# Patient Record
Sex: MALE | Race: WHITE | NOT HISPANIC OR LATINO | Employment: OTHER | URBAN - METROPOLITAN AREA
[De-identification: names, ages, dates, MRNs, and addresses within clinical notes are randomized per-mention and may not be internally consistent; named-entity substitution may affect disease eponyms.]

---

## 2018-01-23 ENCOUNTER — OFFICE VISIT CONVERTED (OUTPATIENT)
Dept: OTOLARYNGOLOGY | Facility: CLINIC | Age: 78
End: 2018-01-23
Attending: OTOLARYNGOLOGY

## 2019-01-02 ENCOUNTER — OFFICE VISIT CONVERTED (OUTPATIENT)
Dept: NEUROLOGY | Facility: CLINIC | Age: 79
End: 2019-01-02
Attending: PSYCHIATRY & NEUROLOGY

## 2019-04-24 ENCOUNTER — HOSPITAL ENCOUNTER (OUTPATIENT)
Dept: LAB | Facility: HOSPITAL | Age: 79
Discharge: HOME | End: 2019-04-24
Attending: INTERNAL MEDICINE

## 2019-04-24 LAB
25(OH)D3 SERPL-MCNC: 63.7 NG/ML (ref 30–100)
ALBUMIN SERPL-MCNC: 4 G/DL (ref 3.5–5)
ALBUMIN/GLOB SERPL: 1.4 {RATIO} (ref 1.4–2.6)
ALP SERPL-CCNC: 86 U/L (ref 56–155)
ALT SERPL-CCNC: 17 U/L (ref 10–40)
ANION GAP SERPL CALC-SCNC: 16 MMOL/L (ref 8–19)
APPEARANCE UR: CLEAR
AST SERPL-CCNC: 16 U/L (ref 15–50)
BASOPHILS # BLD AUTO: 0.06 10*3/UL (ref 0–0.2)
BASOPHILS NFR BLD AUTO: 0.8 % (ref 0–3)
BILIRUB SERPL-MCNC: 0.41 MG/DL (ref 0.2–1.3)
BILIRUB UR QL: NEGATIVE
BUN SERPL-MCNC: 23 MG/DL (ref 5–25)
BUN/CREAT SERPL: 21 {RATIO} (ref 6–20)
CALCIUM SERPL-MCNC: 9.2 MG/DL (ref 8.7–10.4)
CHLORIDE SERPL-SCNC: 105 MMOL/L (ref 99–111)
CHOLEST SERPL-MCNC: 140 MG/DL (ref 107–200)
CHOLEST/HDLC SERPL: 3.3 {RATIO} (ref 3–6)
COLOR UR: YELLOW
CONV ABS IMM GRAN: 0.03 10*3/UL (ref 0–0.2)
CONV CO2: 26 MMOL/L (ref 22–32)
CONV COLLECTION SOURCE (UA): NORMAL
CONV IMMATURE GRAN: 0.4 % (ref 0–1.8)
CONV TOTAL PROTEIN: 6.8 G/DL (ref 6.3–8.2)
CONV UROBILINOGEN IN URINE BY AUTOMATED TEST STRIP: 0.2 {EHRLICHU}/DL (ref 0.1–1)
CREAT UR-MCNC: 1.08 MG/DL (ref 0.7–1.2)
DEPRECATED RDW RBC AUTO: 46.2 FL (ref 35.1–43.9)
EOSINOPHIL # BLD AUTO: 0.27 10*3/UL (ref 0–0.7)
EOSINOPHIL # BLD AUTO: 3.8 % (ref 0–7)
ERYTHROCYTE [DISTWIDTH] IN BLOOD BY AUTOMATED COUNT: 15.1 % (ref 11.6–14.4)
GFR SERPLBLD BASED ON 1.73 SQ M-ARVRAT: >60 ML/MIN/{1.73_M2}
GLOBULIN UR ELPH-MCNC: 2.8 G/DL (ref 2–3.5)
GLUCOSE SERPL-MCNC: 95 MG/DL (ref 70–99)
GLUCOSE UR QL: NEGATIVE MG/DL
HBA1C MFR BLD: 14.4 G/DL (ref 14–18)
HCT VFR BLD AUTO: 46.4 % (ref 42–52)
HDLC SERPL-MCNC: 43 MG/DL (ref 40–60)
HGB UR QL STRIP: NEGATIVE
KETONES UR QL STRIP: NEGATIVE MG/DL
LDLC SERPL CALC-MCNC: 77 MG/DL (ref 70–100)
LEUKOCYTE ESTERASE UR QL STRIP: NEGATIVE
LYMPHOCYTES # BLD AUTO: 1.96 10*3/UL (ref 1–5)
MCH RBC QN AUTO: 26.4 PG (ref 27–31)
MCHC RBC AUTO-ENTMCNC: 31 G/DL (ref 33–37)
MCV RBC AUTO: 85 FL (ref 80–96)
MONOCYTES # BLD AUTO: 0.64 10*3/UL (ref 0.2–1.2)
MONOCYTES NFR BLD AUTO: 9 % (ref 3–10)
NEUTROPHILS # BLD AUTO: 4.15 10*3/UL (ref 2–8)
NEUTROPHILS NFR BLD AUTO: 58.4 % (ref 30–85)
NITRITE UR QL STRIP: NEGATIVE
NRBC CBCN: 0 % (ref 0–0.7)
OSMOLALITY SERPL CALC.SUM OF ELEC: 299 MOSM/KG (ref 273–304)
PH UR STRIP.AUTO: 5.5 [PH] (ref 5–8)
PLATELET # BLD AUTO: 289 10*3/UL (ref 130–400)
PMV BLD AUTO: 10 FL (ref 9.4–12.4)
POTASSIUM SERPL-SCNC: 4.3 MMOL/L (ref 3.5–5.3)
PROT UR QL: NEGATIVE MG/DL
PSA SERPL-MCNC: 6.83 NG/ML (ref 0–4)
RBC # BLD AUTO: 5.46 10*6/UL (ref 4.7–6.1)
SODIUM SERPL-SCNC: 143 MMOL/L (ref 135–147)
SP GR UR: 1.02 (ref 1–1.03)
T4 FREE SERPL-MCNC: 1.1 NG/DL (ref 0.9–1.8)
TRIGL SERPL-MCNC: 101 MG/DL (ref 40–150)
TSH SERPL-ACNC: 0.93 M[IU]/L (ref 0.27–4.2)
VARIANT LYMPHS NFR BLD MANUAL: 27.6 % (ref 20–45)
VLDLC SERPL-MCNC: 20 MG/DL (ref 5–37)
WBC # BLD AUTO: 7.11 10*3/UL (ref 4.8–10.8)

## 2019-05-15 ENCOUNTER — HOSPITAL ENCOUNTER (OUTPATIENT)
Dept: GENERAL RADIOLOGY | Facility: HOSPITAL | Age: 79
Discharge: HOME | End: 2019-05-15
Attending: INTERNAL MEDICINE

## 2019-07-01 ENCOUNTER — OFFICE VISIT CONVERTED (OUTPATIENT)
Dept: GASTROENTEROLOGY | Facility: CLINIC | Age: 79
End: 2019-07-01
Attending: NURSE PRACTITIONER

## 2019-07-03 ENCOUNTER — HOSPITAL ENCOUNTER (OUTPATIENT)
Dept: GENERAL RADIOLOGY | Facility: HOSPITAL | Age: 79
Discharge: HOME | End: 2019-07-03
Attending: NURSE PRACTITIONER

## 2019-07-10 ENCOUNTER — OFFICE VISIT CONVERTED (OUTPATIENT)
Dept: NEUROLOGY | Facility: CLINIC | Age: 79
End: 2019-07-10
Attending: PSYCHIATRY & NEUROLOGY

## 2019-07-23 ENCOUNTER — HOSPITAL ENCOUNTER (OUTPATIENT)
Dept: GASTROENTEROLOGY | Facility: HOSPITAL | Age: 79
Setting detail: HOSPITAL OUTPATIENT SURGERY
Discharge: HOME | End: 2019-07-23
Attending: INTERNAL MEDICINE

## 2019-08-05 ENCOUNTER — OFFICE VISIT CONVERTED (OUTPATIENT)
Dept: OTOLARYNGOLOGY | Facility: CLINIC | Age: 79
End: 2019-08-05
Attending: OTOLARYNGOLOGY

## 2019-10-01 ENCOUNTER — CONVERSION ENCOUNTER (OUTPATIENT)
Dept: GASTROENTEROLOGY | Facility: CLINIC | Age: 79
End: 2019-10-01

## 2019-10-01 ENCOUNTER — OFFICE VISIT CONVERTED (OUTPATIENT)
Dept: GASTROENTEROLOGY | Facility: CLINIC | Age: 79
End: 2019-10-01
Attending: NURSE PRACTITIONER

## 2019-11-11 ENCOUNTER — OFFICE VISIT CONVERTED (OUTPATIENT)
Dept: OTOLARYNGOLOGY | Facility: CLINIC | Age: 79
End: 2019-11-11
Attending: OTOLARYNGOLOGY

## 2019-12-03 ENCOUNTER — OFFICE VISIT CONVERTED (OUTPATIENT)
Dept: NEUROLOGY | Facility: CLINIC | Age: 79
End: 2019-12-03
Attending: PSYCHIATRY & NEUROLOGY

## 2020-04-06 ENCOUNTER — TELEMEDICINE CONVERTED (OUTPATIENT)
Dept: NEUROLOGY | Facility: CLINIC | Age: 80
End: 2020-04-06
Attending: PSYCHIATRY & NEUROLOGY

## 2020-04-27 ENCOUNTER — HOSPITAL ENCOUNTER (OUTPATIENT)
Dept: LAB | Facility: HOSPITAL | Age: 80
Discharge: HOME | End: 2020-04-27
Attending: INTERNAL MEDICINE

## 2020-04-27 LAB
25(OH)D3 SERPL-MCNC: 56.9 NG/ML (ref 30–100)
ALBUMIN SERPL-MCNC: 4 G/DL (ref 3.5–5)
ALBUMIN/GLOB SERPL: 1.3 {RATIO} (ref 1.4–2.6)
ALP SERPL-CCNC: 93 U/L (ref 56–155)
ALT SERPL-CCNC: 13 U/L (ref 10–40)
ANION GAP SERPL CALC-SCNC: 19 MMOL/L (ref 8–19)
APPEARANCE UR: CLEAR
AST SERPL-CCNC: 14 U/L (ref 15–50)
BASOPHILS # BLD AUTO: 0.07 10*3/UL (ref 0–0.2)
BASOPHILS NFR BLD AUTO: 0.9 % (ref 0–3)
BILIRUB SERPL-MCNC: 0.47 MG/DL (ref 0.2–1.3)
BILIRUB UR QL: NEGATIVE
BNP SERPL-MCNC: 99 PG/ML (ref 0–1800)
BUN SERPL-MCNC: 22 MG/DL (ref 5–25)
BUN/CREAT SERPL: 22 {RATIO} (ref 6–20)
CALCIUM SERPL-MCNC: 9.3 MG/DL (ref 8.7–10.4)
CHLORIDE SERPL-SCNC: 105 MMOL/L (ref 99–111)
CHOLEST SERPL-MCNC: 151 MG/DL (ref 107–200)
CHOLEST/HDLC SERPL: 3.4 {RATIO} (ref 3–6)
COLOR UR: YELLOW
CONV ABS IMM GRAN: 0.03 10*3/UL (ref 0–0.2)
CONV CO2: 23 MMOL/L (ref 22–32)
CONV COLLECTION SOURCE (UA): NORMAL
CONV IMMATURE GRAN: 0.4 % (ref 0–1.8)
CONV TOTAL PROTEIN: 7 G/DL (ref 6.3–8.2)
CONV UROBILINOGEN IN URINE BY AUTOMATED TEST STRIP: 1 {EHRLICHU}/DL (ref 0.1–1)
CORTIS AM PEAK SERPL-MCNC: 17.1 UG/DL (ref 6–18.4)
CREAT UR-MCNC: 0.99 MG/DL (ref 0.7–1.2)
DEPRECATED RDW RBC AUTO: 44.1 FL (ref 35.1–43.9)
EOSINOPHIL # BLD AUTO: 0.29 10*3/UL (ref 0–0.7)
EOSINOPHIL # BLD AUTO: 3.6 % (ref 0–7)
ERYTHROCYTE [DISTWIDTH] IN BLOOD BY AUTOMATED COUNT: 14.7 % (ref 11.6–14.4)
GFR SERPLBLD BASED ON 1.73 SQ M-ARVRAT: >60 ML/MIN/{1.73_M2}
GLOBULIN UR ELPH-MCNC: 3 G/DL (ref 2–3.5)
GLUCOSE SERPL-MCNC: 91 MG/DL (ref 70–99)
GLUCOSE UR QL: NEGATIVE MG/DL
HCT VFR BLD AUTO: 47.6 % (ref 42–52)
HDLC SERPL-MCNC: 45 MG/DL (ref 40–60)
HGB BLD-MCNC: 14.5 G/DL (ref 14–18)
HGB UR QL STRIP: NEGATIVE
KETONES UR QL STRIP: NEGATIVE MG/DL
LDLC SERPL CALC-MCNC: 88 MG/DL (ref 70–100)
LEUKOCYTE ESTERASE UR QL STRIP: NEGATIVE
LYMPHOCYTES # BLD AUTO: 1.88 10*3/UL (ref 1–5)
LYMPHOCYTES NFR BLD AUTO: 23.6 % (ref 20–45)
MCH RBC QN AUTO: 25.4 PG (ref 27–31)
MCHC RBC AUTO-ENTMCNC: 30.5 G/DL (ref 33–37)
MCV RBC AUTO: 83.5 FL (ref 80–96)
MONOCYTES # BLD AUTO: 0.66 10*3/UL (ref 0.2–1.2)
MONOCYTES NFR BLD AUTO: 8.3 % (ref 3–10)
NEUTROPHILS # BLD AUTO: 5.04 10*3/UL (ref 2–8)
NEUTROPHILS NFR BLD AUTO: 63.2 % (ref 30–85)
NITRITE UR QL STRIP: NEGATIVE
NRBC CBCN: 0 % (ref 0–0.7)
OSMOLALITY SERPL CALC.SUM OF ELEC: 299 MOSM/KG (ref 273–304)
PH UR STRIP.AUTO: 5 [PH] (ref 5–8)
PLATELET # BLD AUTO: 294 10*3/UL (ref 130–400)
PMV BLD AUTO: 10.7 FL (ref 9.4–12.4)
POTASSIUM SERPL-SCNC: 4.3 MMOL/L (ref 3.5–5.3)
PROT UR QL: NEGATIVE MG/DL
PSA SERPL-MCNC: 8.4 NG/ML (ref 0–4)
RBC # BLD AUTO: 5.7 10*6/UL (ref 4.7–6.1)
SODIUM SERPL-SCNC: 143 MMOL/L (ref 135–147)
SP GR UR: 1.02 (ref 1–1.03)
T4 FREE SERPL-MCNC: 1.1 NG/DL (ref 0.9–1.8)
TRIGL SERPL-MCNC: 89 MG/DL (ref 40–150)
TSH SERPL-ACNC: 1.07 M[IU]/L (ref 0.27–4.2)
VLDLC SERPL-MCNC: 18 MG/DL (ref 5–37)
WBC # BLD AUTO: 7.97 10*3/UL (ref 4.8–10.8)

## 2020-05-11 ENCOUNTER — TELEMEDICINE CONVERTED (OUTPATIENT)
Dept: UROLOGY | Facility: CLINIC | Age: 80
End: 2020-05-11
Attending: UROLOGY

## 2020-05-31 LAB — SARS-COV-2 RNA SPEC QL NAA+PROBE: NOT DETECTED

## 2020-06-02 ENCOUNTER — HOSPITAL ENCOUNTER (OUTPATIENT)
Dept: PERIOP | Facility: HOSPITAL | Age: 80
Setting detail: HOSPITAL OUTPATIENT SURGERY
Discharge: HOME | End: 2020-06-02
Attending: UROLOGY

## 2020-06-10 ENCOUNTER — OFFICE VISIT CONVERTED (OUTPATIENT)
Dept: UROLOGY | Facility: CLINIC | Age: 80
End: 2020-06-10
Attending: UROLOGY

## 2020-06-10 ENCOUNTER — CONVERSION ENCOUNTER (OUTPATIENT)
Dept: SURGERY | Facility: CLINIC | Age: 80
End: 2020-06-10

## 2020-06-11 ENCOUNTER — HOSPITAL ENCOUNTER (OUTPATIENT)
Dept: CT IMAGING | Facility: HOSPITAL | Age: 80
Discharge: HOME | End: 2020-06-11
Attending: UROLOGY

## 2020-06-11 LAB
CREAT BLD-MCNC: 0.9 MG/DL (ref 0.6–1.4)
GFR SERPLBLD BASED ON 1.73 SQ M-ARVRAT: >60 ML/MIN/{1.73_M2}

## 2020-06-16 ENCOUNTER — HOSPITAL ENCOUNTER (OUTPATIENT)
Dept: NUCLEAR MEDICINE | Facility: HOSPITAL | Age: 80
Discharge: HOME | End: 2020-06-16
Attending: UROLOGY

## 2020-06-17 ENCOUNTER — OFFICE VISIT CONVERTED (OUTPATIENT)
Dept: UROLOGY | Facility: CLINIC | Age: 80
End: 2020-06-17
Attending: UROLOGY

## 2020-06-17 ENCOUNTER — CONVERSION ENCOUNTER (OUTPATIENT)
Dept: SURGERY | Facility: CLINIC | Age: 80
End: 2020-06-17

## 2020-07-10 ENCOUNTER — HOSPITAL ENCOUNTER (OUTPATIENT)
Dept: PREADMISSION TESTING | Facility: HOSPITAL | Age: 80
Discharge: HOME | End: 2020-07-10
Attending: UROLOGY

## 2020-07-10 ENCOUNTER — HOSPITAL ENCOUNTER (OUTPATIENT)
Dept: PERIOP | Facility: HOSPITAL | Age: 80
Discharge: HOME | End: 2020-07-10
Attending: UROLOGY

## 2020-07-10 LAB
ALBUMIN SERPL-MCNC: 4.1 G/DL (ref 3.5–5)
ALBUMIN/GLOB SERPL: 1.5 {RATIO} (ref 1.4–2.6)
ALP SERPL-CCNC: 89 U/L (ref 56–155)
ALT SERPL-CCNC: 15 U/L (ref 10–40)
ANION GAP SERPL CALC-SCNC: 14 MMOL/L (ref 8–19)
AST SERPL-CCNC: 16 U/L (ref 15–50)
BASOPHILS # BLD AUTO: 0.06 10*3/UL (ref 0–0.2)
BASOPHILS NFR BLD AUTO: 0.7 % (ref 0–3)
BILIRUB SERPL-MCNC: 0.38 MG/DL (ref 0.2–1.3)
BUN SERPL-MCNC: 26 MG/DL (ref 5–25)
BUN/CREAT SERPL: 25 {RATIO} (ref 6–20)
CALCIUM SERPL-MCNC: 9.3 MG/DL (ref 8.7–10.4)
CHLORIDE SERPL-SCNC: 106 MMOL/L (ref 99–111)
CONV ABS IMM GRAN: 0.03 10*3/UL (ref 0–0.2)
CONV CO2: 26 MMOL/L (ref 22–32)
CONV IMMATURE GRAN: 0.4 % (ref 0–1.8)
CONV TOTAL PROTEIN: 6.9 G/DL (ref 6.3–8.2)
CREAT UR-MCNC: 1.02 MG/DL (ref 0.7–1.2)
DEPRECATED RDW RBC AUTO: 44.8 FL (ref 35.1–43.9)
EOSINOPHIL # BLD AUTO: 0.22 10*3/UL (ref 0–0.7)
EOSINOPHIL # BLD AUTO: 2.6 % (ref 0–7)
ERYTHROCYTE [DISTWIDTH] IN BLOOD BY AUTOMATED COUNT: 14.8 % (ref 11.6–14.4)
GFR SERPLBLD BASED ON 1.73 SQ M-ARVRAT: >60 ML/MIN/{1.73_M2}
GLOBULIN UR ELPH-MCNC: 2.8 G/DL (ref 2–3.5)
GLUCOSE SERPL-MCNC: 100 MG/DL (ref 70–99)
HCT VFR BLD AUTO: 44.8 % (ref 42–52)
HGB BLD-MCNC: 13.8 G/DL (ref 14–18)
INR PPP: 0.96 (ref 2–3)
LYMPHOCYTES # BLD AUTO: 1.63 10*3/UL (ref 1–5)
LYMPHOCYTES NFR BLD AUTO: 19.5 % (ref 20–45)
MCH RBC QN AUTO: 25.7 PG (ref 27–31)
MCHC RBC AUTO-ENTMCNC: 30.8 G/DL (ref 33–37)
MCV RBC AUTO: 83.6 FL (ref 80–96)
MONOCYTES # BLD AUTO: 0.72 10*3/UL (ref 0.2–1.2)
MONOCYTES NFR BLD AUTO: 8.6 % (ref 3–10)
NEUTROPHILS # BLD AUTO: 5.7 10*3/UL (ref 2–8)
NEUTROPHILS NFR BLD AUTO: 68.2 % (ref 30–85)
NRBC CBCN: 0 % (ref 0–0.7)
OSMOLALITY SERPL CALC.SUM OF ELEC: 297 MOSM/KG (ref 273–304)
PLATELET # BLD AUTO: 243 10*3/UL (ref 130–400)
PMV BLD AUTO: 10.5 FL (ref 9.4–12.4)
POTASSIUM SERPL-SCNC: 4.6 MMOL/L (ref 3.5–5.3)
PROTHROMBIN TIME: 10.4 S (ref 9.4–12)
RBC # BLD AUTO: 5.36 10*6/UL (ref 4.7–6.1)
SODIUM SERPL-SCNC: 141 MMOL/L (ref 135–147)
WBC # BLD AUTO: 8.36 10*3/UL (ref 4.8–10.8)

## 2020-07-12 LAB — SARS-COV-2 RNA SPEC QL NAA+PROBE: NOT DETECTED

## 2020-07-14 ENCOUNTER — HOSPITAL ENCOUNTER (OUTPATIENT)
Dept: PERIOP | Facility: HOSPITAL | Age: 80
Setting detail: HOSPITAL OUTPATIENT SURGERY
Discharge: HOME | End: 2020-07-15
Attending: UROLOGY

## 2020-07-14 LAB
ABO GROUP BLD: NORMAL
BLD GP AB SCN SERPL QL: NORMAL
CONV ABD CONTROL: NORMAL
RH BLD: NORMAL

## 2020-07-15 LAB
ANION GAP SERPL CALC-SCNC: 13 MMOL/L (ref 8–19)
BASOPHILS # BLD AUTO: 0.03 10*3/UL (ref 0–0.2)
BASOPHILS NFR BLD AUTO: 0.3 % (ref 0–3)
BUN SERPL-MCNC: 16 MG/DL (ref 5–25)
BUN/CREAT SERPL: 14 {RATIO} (ref 6–20)
CALCIUM SERPL-MCNC: 8.9 MG/DL (ref 8.7–10.4)
CHLORIDE SERPL-SCNC: 105 MMOL/L (ref 99–111)
CONV ABS IMM GRAN: 0.02 10*3/UL (ref 0–0.2)
CONV CO2: 27 MMOL/L (ref 22–32)
CONV IMMATURE GRAN: 0.2 % (ref 0–1.8)
CREAT UR-MCNC: 1.17 MG/DL (ref 0.7–1.2)
DEPRECATED RDW RBC AUTO: 44.5 FL (ref 35.1–43.9)
EOSINOPHIL # BLD AUTO: 0.08 10*3/UL (ref 0–0.7)
EOSINOPHIL # BLD AUTO: 0.9 % (ref 0–7)
ERYTHROCYTE [DISTWIDTH] IN BLOOD BY AUTOMATED COUNT: 14.7 % (ref 11.6–14.4)
GFR SERPLBLD BASED ON 1.73 SQ M-ARVRAT: 58 ML/MIN/{1.73_M2}
GLUCOSE SERPL-MCNC: 104 MG/DL (ref 70–99)
HCT VFR BLD AUTO: 44.6 % (ref 42–52)
HGB BLD-MCNC: 13.6 G/DL (ref 14–18)
LYMPHOCYTES # BLD AUTO: 1.33 10*3/UL (ref 1–5)
LYMPHOCYTES NFR BLD AUTO: 15.2 % (ref 20–45)
MCH RBC QN AUTO: 25.9 PG (ref 27–31)
MCHC RBC AUTO-ENTMCNC: 30.5 G/DL (ref 33–37)
MCV RBC AUTO: 84.8 FL (ref 80–96)
MONOCYTES # BLD AUTO: 0.63 10*3/UL (ref 0.2–1.2)
MONOCYTES NFR BLD AUTO: 7.2 % (ref 3–10)
NEUTROPHILS # BLD AUTO: 6.67 10*3/UL (ref 2–8)
NEUTROPHILS NFR BLD AUTO: 76.2 % (ref 30–85)
NRBC CBCN: 0 % (ref 0–0.7)
OSMOLALITY SERPL CALC.SUM OF ELEC: 293 MOSM/KG (ref 273–304)
PLATELET # BLD AUTO: 264 10*3/UL (ref 130–400)
PMV BLD AUTO: 10.4 FL (ref 9.4–12.4)
POTASSIUM SERPL-SCNC: 4 MMOL/L (ref 3.5–5.3)
RBC # BLD AUTO: 5.26 10*6/UL (ref 4.7–6.1)
SODIUM SERPL-SCNC: 141 MMOL/L (ref 135–147)
WBC # BLD AUTO: 8.76 10*3/UL (ref 4.8–10.8)

## 2020-07-22 ENCOUNTER — OFFICE VISIT CONVERTED (OUTPATIENT)
Dept: UROLOGY | Facility: CLINIC | Age: 80
End: 2020-07-22
Attending: UROLOGY

## 2020-07-22 ENCOUNTER — HOSPITAL ENCOUNTER (OUTPATIENT)
Dept: GENERAL RADIOLOGY | Facility: HOSPITAL | Age: 80
Discharge: HOME | End: 2020-07-22
Attending: UROLOGY

## 2020-08-10 ENCOUNTER — OFFICE VISIT CONVERTED (OUTPATIENT)
Dept: UROLOGY | Facility: CLINIC | Age: 80
End: 2020-08-10
Attending: UROLOGY

## 2020-08-21 ENCOUNTER — HOSPITAL ENCOUNTER (OUTPATIENT)
Dept: CARDIOLOGY | Facility: HOSPITAL | Age: 80
Discharge: HOME | End: 2020-08-21
Attending: INTERNAL MEDICINE

## 2020-09-15 ENCOUNTER — HOSPITAL ENCOUNTER (OUTPATIENT)
Dept: LAB | Facility: HOSPITAL | Age: 80
Discharge: HOME | End: 2020-09-15
Attending: UROLOGY

## 2020-09-17 LAB
BACTERIA UR CULT: ABNORMAL
CIPROFLOXACIN SUSC ISLT: >=8
DAPTOMYCIN SUSC ISLT: 0.25
DOXYCYCLINE SUSC ISLT: <=0.5
ERYTHROMYCIN SUSC ISLT: >=8
GENTAMICIN SUSC ISLT: <=0.5
LEVOFLOXACIN SUSC ISLT: >=8
NITROFURANTOIN SUSC ISLT: <=16
OXACILLIN SUSC ISLT: >=4
RIFAMPIN SUSC ISLT: <=0.5
TETRACYCLINE SUSC ISLT: <=1
TIGECYCLINE SUSC ISLT: <=0.12
TMP SMX SUSC ISLT: <=10
VANCOMYCIN SUSC ISLT: 1

## 2020-10-06 ENCOUNTER — HOSPITAL ENCOUNTER (OUTPATIENT)
Dept: LAB | Facility: HOSPITAL | Age: 80
Discharge: HOME | End: 2020-10-06
Attending: UROLOGY

## 2020-10-06 LAB — PSA SERPL-MCNC: <0.01 NG/ML (ref 0–4)

## 2020-10-08 LAB
BACTERIA UR CULT: ABNORMAL
CEFAZOLIN SUSC ISLT: <=4
CEFEPIME SUSC ISLT: <=0.12
CEFTAZIDIME SUSC ISLT: <=1
CEFTRIAXONE SUSC ISLT: <=0.25
CIPROFLOXACIN SUSC ISLT: <=0.25
ERTAPENEM SUSC ISLT: <=0.12
GENTAMICIN SUSC ISLT: <=1
LEVOFLOXACIN SUSC ISLT: <=0.12
NITROFURANTOIN SUSC ISLT: 32
PIP+TAZO SUSC ISLT: <=4
TMP SMX SUSC ISLT: <=20
TOBRAMYCIN SUSC ISLT: <=1

## 2020-10-12 ENCOUNTER — TELEPHONE CONVERTED (OUTPATIENT)
Dept: UROLOGY | Facility: CLINIC | Age: 80
End: 2020-10-12
Attending: UROLOGY

## 2020-11-04 ENCOUNTER — OFFICE VISIT CONVERTED (OUTPATIENT)
Dept: GASTROENTEROLOGY | Facility: CLINIC | Age: 80
End: 2020-11-04
Attending: NURSE PRACTITIONER

## 2020-11-23 ENCOUNTER — OFFICE VISIT CONVERTED (OUTPATIENT)
Dept: NEUROLOGY | Facility: CLINIC | Age: 80
End: 2020-11-23
Attending: PSYCHIATRY & NEUROLOGY

## 2020-11-23 ENCOUNTER — CONVERSION ENCOUNTER (OUTPATIENT)
Dept: NEUROLOGY | Facility: CLINIC | Age: 80
End: 2020-11-23

## 2020-12-29 ENCOUNTER — HOSPITAL ENCOUNTER (OUTPATIENT)
Dept: LAB | Facility: HOSPITAL | Age: 80
Discharge: HOME | End: 2020-12-29
Attending: UROLOGY

## 2020-12-29 LAB — PSA SERPL-MCNC: <0.01 NG/ML (ref 0–4)

## 2021-01-06 ENCOUNTER — TELEMEDICINE CONVERTED (OUTPATIENT)
Dept: UROLOGY | Facility: CLINIC | Age: 81
End: 2021-01-06
Attending: UROLOGY

## 2021-04-06 ENCOUNTER — HOSPITAL ENCOUNTER (OUTPATIENT)
Dept: LAB | Facility: HOSPITAL | Age: 81
Discharge: HOME | End: 2021-04-06
Attending: UROLOGY

## 2021-04-07 LAB — PSA SERPL-MCNC: <0.01 NG/ML (ref 0–4)

## 2021-04-14 ENCOUNTER — TELEMEDICINE CONVERTED (OUTPATIENT)
Dept: UROLOGY | Facility: CLINIC | Age: 81
End: 2021-04-14
Attending: UROLOGY

## 2021-05-03 ENCOUNTER — HOSPITAL ENCOUNTER (OUTPATIENT)
Dept: LAB | Facility: HOSPITAL | Age: 81
Discharge: HOME | End: 2021-05-03
Attending: INTERNAL MEDICINE

## 2021-05-03 LAB
25(OH)D3 SERPL-MCNC: 74.3 NG/ML (ref 30–100)
ALBUMIN SERPL-MCNC: 4.2 G/DL (ref 3.5–5)
ALBUMIN/GLOB SERPL: 1.4 {RATIO} (ref 1.4–2.6)
ALP SERPL-CCNC: 86 U/L (ref 56–155)
ALT SERPL-CCNC: 15 U/L (ref 10–40)
ANION GAP SERPL CALC-SCNC: 23 MMOL/L (ref 8–19)
AST SERPL-CCNC: 18 U/L (ref 15–50)
BASOPHILS # BLD AUTO: 0.08 10*3/UL (ref 0–0.2)
BASOPHILS NFR BLD AUTO: 1.1 % (ref 0–3)
BILIRUB SERPL-MCNC: 0.49 MG/DL (ref 0.2–1.3)
BUN SERPL-MCNC: 23 MG/DL (ref 5–25)
BUN/CREAT SERPL: 20 {RATIO} (ref 6–20)
CALCIUM SERPL-MCNC: 9.4 MG/DL (ref 8.7–10.4)
CHLORIDE SERPL-SCNC: 108 MMOL/L (ref 99–111)
CONV ABS IMM GRAN: 0.02 10*3/UL (ref 0–0.2)
CONV CO2: 22 MMOL/L (ref 22–32)
CONV IMMATURE GRAN: 0.3 % (ref 0–1.8)
CONV TOTAL PROTEIN: 7.3 G/DL (ref 6.3–8.2)
CREAT UR-MCNC: 1.17 MG/DL (ref 0.7–1.2)
DEPRECATED RDW RBC AUTO: 45.5 FL (ref 35.1–43.9)
EOSINOPHIL # BLD AUTO: 0.17 10*3/UL (ref 0–0.7)
EOSINOPHIL # BLD AUTO: 2.3 % (ref 0–7)
ERYTHROCYTE [DISTWIDTH] IN BLOOD BY AUTOMATED COUNT: 15.1 % (ref 11.6–14.4)
GFR SERPLBLD BASED ON 1.73 SQ M-ARVRAT: 58 ML/MIN/{1.73_M2}
GLOBULIN UR ELPH-MCNC: 3.1 G/DL (ref 2–3.5)
GLUCOSE SERPL-MCNC: 91 MG/DL (ref 70–99)
HCT VFR BLD AUTO: 46.3 % (ref 42–52)
HGB BLD-MCNC: 14.5 G/DL (ref 14–18)
LYMPHOCYTES # BLD AUTO: 1.66 10*3/UL (ref 1–5)
LYMPHOCYTES NFR BLD AUTO: 22.4 % (ref 20–45)
MCH RBC QN AUTO: 26.2 PG (ref 27–31)
MCHC RBC AUTO-ENTMCNC: 31.3 G/DL (ref 33–37)
MCV RBC AUTO: 83.7 FL (ref 80–96)
MONOCYTES # BLD AUTO: 0.57 10*3/UL (ref 0.2–1.2)
MONOCYTES NFR BLD AUTO: 7.7 % (ref 3–10)
NEUTROPHILS # BLD AUTO: 4.92 10*3/UL (ref 2–8)
NEUTROPHILS NFR BLD AUTO: 66.2 % (ref 30–85)
NRBC CBCN: 0 % (ref 0–0.7)
OSMOLALITY SERPL CALC.SUM OF ELEC: 309 MOSM/KG (ref 273–304)
PLATELET # BLD AUTO: 277 10*3/UL (ref 130–400)
PMV BLD AUTO: 10.4 FL (ref 9.4–12.4)
POTASSIUM SERPL-SCNC: 4.5 MMOL/L (ref 3.5–5.3)
PSA SERPL-MCNC: <0.01 NG/ML (ref 0–4)
RBC # BLD AUTO: 5.53 10*6/UL (ref 4.7–6.1)
SODIUM SERPL-SCNC: 148 MMOL/L (ref 135–147)
WBC # BLD AUTO: 7.42 10*3/UL (ref 4.8–10.8)

## 2021-05-12 NOTE — PROGRESS NOTES
Quick Note      Patient Name: Lopez Nunez   Patient ID: [id number]   Sex: Male   YOB: 1940    Primary Care Provider: Joshua Ha MD   Referring Provider: Joshua Ha MD    Visit Date: April 6, 2020    Provider: Jeevan Spear MD   Location: [facility]   Location Address: [address] [place] [address]  [id number]   Location Phone: [phone]          History Of Present Illness  TELEHEALTH VISIT  Chief Complaint: 6 mo f/u parkinsons. Prior pt of Dr. Spear.   Lopez Nunez is a 79 year old /White male who is presenting for evaluation via telehealth visit. Verbal consent obtained before beginning visit.   Provider spent HOW MIN minutes with the patient during telehealth visit.   The following staff were present during this visit: Sapna/   Past Medical History/Overview of Patient Symptoms     79-year-old man here for follow-up of his ocular myasthenia and Parkinson's.  He states that he is independent with all activities of daily living such as mowing his lawn, putting fertilizer on however his tremor gets worse following activities.  He is taking Mirapex 0.75 mg 3 times a day at this time.  He has no adverse effects and he thinks it works for the tremor.  For his ocular myasthenia he is taking CellCept 250 mg daily and has been taking it for over 5 years.  He has been followed with his labs by Dr. Ha.  He has no adverse effects.  He has not had any episodes of double vision.  He has never had any drooping of his eyelids.           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 I told him he can take an extra 0.75 mg about the time he is doing activities of daily living to see if it controls his tremor. He is to inform us if he has any problems with this dose. I will see him again in 8 months time for follow-up. He is to follow-up with Dr. Ha to check his laboratory  parameters this month.    25 minutes was spent for this tele health visit.  · Ocular myasthenia gravis     358.00/G70.00  He has been on higher doses of CellCept in the past but maintenance dose of 250 mg is controlling his ocular myasthenia. His continue taking this dose.      Plan  · Orders  o Physician Telephone Evaluation, 21-30 minutes (10465) - 332.0/G20, 358.00/G70.00 - 04/06/2020  · Medications  o Medications have been Reconciled  o Transition of Care or Provider Policy  · Instructions  o Plan Of Care:   · Disposition  o Follow Up 7 months.            Electronically Signed by: Jeevan Spear MD -Author on April 6, 2020 08:38:31 AM

## 2021-05-13 NOTE — PROGRESS NOTES
Progress Note      Patient Name: Lopez Nunez   Patient ID: [id number]   Sex: Male   YOB: 1940    Primary Care Provider: Joshua Ha MD   Referring Provider: Joshua Ha MD    Visit Date: May 11, 2020    Provider: Dede Barker MD   Location: [facility]   Location Address: [address] [place] [address]  [id number]   Location Phone: [phone]          History Of Present Illness  The patient returns for a routine follow-up after a previous visit approximately 4 years ago, for a follow-up elevated PSA. The current PSA level is 8.40. The patient states he has no urinary complaints of hesitancy, nocturia, frequency or decrease in stream.   The patient is currently not taking any Urology specific medications.   To date, besides the PSA level, no other diagnostic studies have been performed. He has not had any change in his care since last visit.      He has had two previous prostate biopsies, the first of which showed ASAP and the second which was negative.  That was about 6 years ago.     He has had no change in symptoms.      His most recent PSA was 8.40.  It has been between 5 and 6 for the last five years but now is significantly changed.       Past Medical History  Chronic rhinitis; Dysphagia; Elevated PSA; Hypertension; Mixed conductive and sensorineural hearing loss of left ear; Myasthenia gravis; Parkinson's Disease; Sensorineural hearing loss (SNHL) of right ear; Tremors; Tympanic membrane perforation, left         Past Surgical History  Colonoscopy; Cyst Removal; EGD; Prostate Biopsy; Tonsilectomy         Medication List  amlodipine 10 mg oral tablet; aspirin 81 mg oral tablet,delayed release (DR/EC); CellCept 250 mg oral capsule; Centrum Silver 0.4-300-250 mg-mcg-mcg oral tablet; lisinopril 40 mg oral tablet; omeprazole 20 mg oral capsule,delayed release(DR/EC); pramipexole 0.5 mg oral tablet; Vitamin D3 2,000 unit oral capsule         Allergy List  NO KNOWN DRUG ALLERGIES        Allergies Reconciled  Family Medical History  Family history of cancer; Family history of heart disease; Family history of diabetes mellitus         Social History  Alcohol (Light); Denies substance abuse (Never); ; Tobacco (Former); Working         Review of Systems  · Constitutional  o Denies  o : fatigue, fever  · Gastrointestinal  o Denies  o : nausea, vomiting      Physical Examination  · Constitutional  o Appearance  o : well-nourished, well developed, alert, in no acute distress  · Head and Face  o Head  o :   § Inspection  § : atraumatic, normocephalic  o Face  o :   § Inspection  § : no facial lesions  · Eyes  o Sclerae  o : sclerae white  · Ears, Nose, Mouth and Throat  o Ears  o :   § External Ears  § : appearance within normal limits, no lesions present  o Nose  o :   § External Nose  § : appearance normal  · Respiratory  o Respiratory Effort  o : breathing unlabored  · Neurologic  o Mental Status Examination  o :   § Speech/Language  § : communication ability within normal limits  · Psychiatric  o Judgement and Insight  o : judgment and insight intact, judgement for everyday activities and social situations within normal limits, insight intact  o Mood and Affect  o : mood normal, affect appropriate              Assessment  · Elevated prostate specific antigen (PSA)     790.93/R97.2      Plan  · Orders  o MRI prostate wo then w contrast (97211) - 790.93/R97.2 - 05/11/2020  · Medications  o Medications have been Reconciled  o Transition of Care or Provider Policy  · Instructions  o DISCUSSION:  o The patient is counselled regarding the rising PSA and the risk of prostate cancer. Options include observation, antibiotic therapy to attempt to decrease the PSA or prostate US and biopsy. The risks and benefits of each approach were discussed with him. We will get a prostate MRI and proceed from there.             Electronically Signed by: Dede Barker MD -Author on May 11, 2020 03:29:38 PM

## 2021-05-13 NOTE — PROGRESS NOTES
Progress Note      Patient Name: Lopez Nunez   Patient ID: [id number]   Sex: Male   YOB: 1940    Primary Care Provider: Joshua Ha MD   Referring Provider: Joshua Ha MD    Visit Date: November 23, 2020    Provider: Jeevan Spear MD   Location: [facility]   Location Address: [address] [place] [address]  [id number]   Location Phone: [phone]          Chief Complaint     8 mo f/u parkinsonian tremor, ocular myasthenia gravis. Previous visit was a televisit. Pt states he is doing well.       History Of Present Illness  Lopez Nunez is a 80 year old /White male who presents today to [facility] today referred from Joshua Ha MD.      80-year-old man follow-up visit for ocular myasthenia and Parkinson's disease.  He has been on CellCept for the last 15 years.  He has not had any episodes of ptosis or double vision since he was started on CellCept.  He cannot tolerate prednisone.  He was started on that by Dr. Choudhury.  He developed prostate cancer.  He has not asked his urologist Dr. Barker if he should continue taking CellCept since there is a warning regarding CellCept and malignancy especially prostate cancer.  I discussed with him that CellCept is one of the medications that can cause cancer and be prone to infections.  It is reserved for general myasthenia gravis usually and not for ocular myasthenia gravis and has been taking it for 15 years.  I discussed with him that he needs to ask his urologist regarding CellCept and prostate cancer and I will take him off CellCept if he is at increased risk of progressing with his prostate cancer.    In regards to his Parkinson's tremor especially left hand as well as his jaw he is taking Mirapex 0.75 mg 4 times a day and he states that it is helping his tremors especially after he does activities.  In the past he was diagnosed to have essential tremor however another neurologist told him  he had Parkinson's tremor.  He states that it is helping him that he is able to do all activities of daily living without any problems.  Gait is unaffected.       Past Medical History  Chronic rhinitis; Dysphagia; Elevated PSA; Hypertension; Mixed conductive and sensorineural hearing loss of left ear; Myasthenia gravis; Parkinson's Disease; Prostate Cancer; Sensorineural hearing loss (SNHL) of right ear; Tremors; Tympanic membrane perforation, left         Past Surgical History  Colonoscopy; Cyst Removal; EGD; Prostate Biopsy; Tonsilectomy         Medication List  amlodipine 10 mg oral tablet; aspirin 81 mg oral tablet,delayed release (DR/EC); CellCept 250 mg oral capsule; Centrum Silver 0.4-300-250 mg-mcg-mcg oral tablet; lisinopril 40 mg oral tablet; pramipexole 0.75 mg oral tablet; Vitamin D3 2,000 unit oral capsule         Allergy List  NO KNOWN DRUG ALLERGIES         Family Medical History  Family history of cancer; Family history of heart disease; Family history of diabetes mellitus         Social History  Alcohol (Light); Denies substance abuse (Never); ; Tobacco (Former); Working         Review of Systems  · Constitutional  o Denies  o : chills, excessive sweating, fatigue, fever, sycope/passing out, weight gain, weight loss  · Eyes  o Denies  o : changes in vision, blurry vision, double vision  · HENT  o Denies  o : loss of hearing, ringing in the ears, ear aches, sore throat, nasal congestion, sinus pain, nose bleeds, seasonal allergies  · Cardiovascular  o Denies  o : blood clots, swollen legs, anemia, easy burising or bleeding, transfusions  · Respiratory  o Denies  o : shortness of breath, dry cough, productive cough, pneumonia, COPD  · Gastrointestinal  o Denies  o : difficulty swallowing, reflux  · Genitourinary  o Denies  o : incontinence  · Neurologic  o Denies  o : headache, seizure, stroke, tremor, loss of balance, falls, dizziness/vertigo, difficulty with sleep,  "numbness/tingling/paresthesia , difficulty with coordination, difficulty with dexterity, weakness  · Musculoskeletal  o Denies  o : neck stiffness/pain, swollen lymph nodes, muscle aches, joint pain, weakness, spasms, sciatica, pain radiating in arm, pain radiating in leg, low back pain  · Endocrine  o Denies  o : diabetes, thyroid disorder  · Psychiatric  o Denies  o : anxiety, depression      Vitals  Date Time BP Position Site L\R Cuff Size HR RR TEMP (F) WT  HT  BMI kg/m2 BSA m2 O2 Sat FR L/min FiO2 HC       11/23/2020 08:54 AM        95.9           11/23/2020 09:11 /52 Sitting    46 - R   166lbs 16oz 5'  11\" 23.29 1.95             Physical Examination     He is alert, fluent, phasic, follows commands well.  There is a jaw tremor noted parkinsonian frequency.  There is a tremor noted in left hand Parkinson frequency as well.  There is no rigidity cogwheeling.  Station gait is able to tiptoe, heel walk, joel with normal arm swing.  There is no tremor noted.  Heart is regular rhythm normal in rate.           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 I will not start him on any new medications at this time since it is helping him. Should he have any problems with bradykinesia, gait abnormalities I will start him on carbidopa/levodopa.  · Ocular myasthenia gravis     358.00/G70.00  I had a long discussion with him and his wife regarding CellCept. I am not sure if prolonged use of CellCept predisposed him to prostate cancer. He is to ask his urologist if taking CellCept will affect his prostate cancer and I will take him off CellCept. He is to call us to give us a progress report.    45 minutes was spent for this high complexity encounter more than half the time was spent face-to-face with the patient for examination, counseling, planning and recommendations.      Plan  · Medications  o Medications have been Reconciled  o Transition of Care or " Provider Policy  · Instructions  o Encouraged to follow-up with Primary Care Provider for preventative care.            Electronically Signed by: Jeevan Spear MD -Author on November 23, 2020 09:58:08 AM

## 2021-05-13 NOTE — PROGRESS NOTES
Progress Note      Patient Name: Lopez Nunez   Patient ID: [id number]   Sex: Male   YOB: 1940    Primary Care Provider: Joshua Ha MD   Referring Provider: Joshua Ha MD    Visit Date: November 4, 2020    Provider: ALIN Gomez   Location: [facility]   Location Address: [address] [place] [address], [address] [place] [address]  [id number]   Location Phone: [phone]          Chief Complaint  · Follow up GERD      History Of Present Illness     Mr. Nunez is a 81 y/o male with recent PMH of prostate cancer s/p radical prostatectomy.      He and his wife are present today and reports that he is doing well with omeprazole 20 mg daily.  Denies any recent dysphagia.             Past Medical History  Chronic rhinitis; Dysphagia; Elevated PSA; Hypertension; Mixed conductive and sensorineural hearing loss of left ear; Myasthenia gravis; Parkinson's Disease; Prostate Cancer; Sensorineural hearing loss (SNHL) of right ear; Tremors; Tympanic membrane perforation, left         Past Surgical History  Colonoscopy; Cyst Removal; EGD; Prostate Biopsy; Tonsilectomy         Medication List  amlodipine 10 mg oral tablet; aspirin 81 mg oral tablet,delayed release (DR/EC); CellCept 250 mg oral capsule; Centrum Silver 0.4-300-250 mg-mcg-mcg oral tablet; lisinopril 40 mg oral tablet; omeprazole 20 mg oral capsule,delayed release(DR/EC); pramipexole 0.5 mg oral tablet; Vitamin D3 2,000 unit oral capsule         Allergy List  NO KNOWN DRUG ALLERGIES       Allergies Reconciled  Family Medical History  Family history of cancer; Family history of heart disease; Family history of diabetes mellitus         Social History  Alcohol (Light); Denies substance abuse (Never); ; Tobacco (Former); Working         Review of Systems  · Constitutional  o Denies  o : chills, fever  · Cardiovascular  o Denies  o : chest pain, irregular heart beats  · Respiratory  o Denies  o : cough, shortness of  "breath  · Gastrointestinal  o Admits  o : see HPI   · Endocrine  o Denies  o : weight gain, weight loss      Vitals  Date Time BP Position Site L\R Cuff Size HR RR TEMP (F) WT  HT  BMI kg/m2 BSA m2 O2 Sat FR L/min FiO2 HC       11/04/2020 03:34 /51 Sitting      98.3 167lbs 2oz 5'  11\" 23.31 1.95             Physical Examination  · Constitutional  o Appearance  o : Healthy-appearing, awake and alert in no acute distress  · Head and Face  o Head  o : Normocephalic with no worriesome skin lesions  · Eyes  o Vision  o :   § Visual Fields  § : eyes move symmetrical in all directions  o Sclerae  o : sclerae anicteric  o Pupils and Irises  o : pupils equal and symmetrical  · Neck  o Inspection/Palpation  o : Trachea is midline, no adenopathy  · Respiratory  o Respiratory Effort  o : Breathing is unlabored.  o Inspection of Chest  o : normal appearance  o Auscultation of Lungs  o : Chest is clear to auscultation bilaterally.  · Cardiovascular  o Heart  o :   § Auscultation of Heart  § : no murmurs, rubs, or gallops  o Peripheral Vascular System  o :   § Extremities  § : no cyanosis, clubbing or edema;   · Gastrointestinal  o Abdominal Examination  o : Abdomen is soft, nontender to palpation, with normal active bowel sounds, no appreciable hepatosplenomegaly.  o Digital Rectal Exam  o : deferred  · Skin and Subcutaneous Tissue  o General Inspection  o : without focal lesions; turgor is normal  · Psychiatric  o General  o : Alert and oriented x3  o Mood and Affect  o : Mood and affect are appropriate to circumstances  · Extremities  o Extremities  o : No edema, no cyanosis          Assessment  · Dysphagia     787.20/R13.10  · Gastroesophageal Reflux     530.81/K21.9      Plan  · Medications  o omeprazole 20 mg oral capsule,delayed release(DR/EC)   SIG: take 1 capsule (20 mg) by oral route once daily before a meal for 90 days   DISP: (90) Capsule with 3 refills  Refilled on 11/04/2020     o Medications have been " Reconciled  o Transition of Care or Provider Policy  · Instructions  o Information given on current diagnoses.  · Disposition  o Follow up 1 year            Electronically Signed by: ALIN Gomez -Author on November 4, 2020 04:08:24 PM

## 2021-05-13 NOTE — PROGRESS NOTES
Progress Note      Patient Name: Lopez Nunez   Patient ID: [id number]   Sex: Male   YOB: 1940    Primary Care Provider: Joshua Ha MD   Referring Provider: Joshua Ha MD    Visit Date: Eli 10, 2020    Provider: Dede Barker MD   Location: [facility]   Location Address: [address] [place] [address]  [id number]   Location Phone: [phone]          Chief Complaint  · pt here for urologic issues      History Of Present Illness  The patient returns for a routine follow-up after a previous visit approximately 4 years ago, for a follow-up elevated PSA. The current PSA level is 8.40. The patient states he has no urinary complaints of hesitancy, nocturia, frequency or decrease in stream.   The patient is currently not taking any Urology specific medications.   To date, besides the PSA level, no other diagnostic studies have been performed. He has not had any change in his care since last visit.      He has had two previous prostate biopsies, the first of which showed ASAP and the second which was negative.  That was about 6 years ago.     He has had no change in symptoms.      His most recent PSA was 8.40.  It has been between 5 and 6 for the last five years but now is significantly changed.    He had a prostate MRI and that showed an area concerning for prostate cancer.  He had a prostate biopsy in the OR last week.  There was no prostate cancer on the right side.  His left-sided biopsies 2 of those were positive for Medhat 4+4 prostate cancer.  The region of interest was positive in 4 out of 4 biopsies for Medhat 4+4 prostate cancer.  We went over these results with him today.       Past Medical History  Chronic rhinitis; Dysphagia; Elevated PSA; Hypertension; Mixed conductive and sensorineural hearing loss of left ear; Myasthenia gravis; Parkinson's Disease; Prostate cancer; Sensorineural hearing loss (SNHL) of right ear; Tremors; Tympanic membrane perforation, left         Past  "Surgical History  Colonoscopy; Cyst Removal; EGD; Prostate Biopsy; Tonsilectomy         Medication List  amlodipine 10 mg oral tablet; aspirin 81 mg oral tablet,delayed release (DR/EC); CellCept 250 mg oral capsule; Centrum Silver 0.4-300-250 mg-mcg-mcg oral tablet; lisinopril 40 mg oral tablet; omeprazole 20 mg oral capsule,delayed release(DR/EC); pramipexole 0.5 mg oral tablet; Vitamin D3 2,000 unit oral capsule         Allergy List  NO KNOWN DRUG ALLERGIES         Family Medical History  Family history of cancer; Family history of heart disease; Family history of diabetes mellitus         Social History  Alcohol (Light); Denies substance abuse (Never); ; Tobacco (Former); Working         Review of Systems  · Constitutional  o Denies  o : chills, fever  · Gastrointestinal  o Denies  o : nausea, vomiting      Vitals  Date Time BP Position Site L\R Cuff Size HR RR TEMP (F) WT  HT  BMI kg/m2 BSA m2 O2 Sat        06/10/2020 10:22 /50 Sitting       178lbs 0oz 5'  11\" 24.83 2.01           Physical Examination  · Constitutional  o Appearance  o : well-nourished, well developed, alert, in no acute distress  · Head and Face  o Head  o :   § Inspection  § : atraumatic, normocephalic  o Face  o :   § Inspection  § : no facial lesions  · Eyes  o Sclerae  o : sclerae white  · Ears, Nose, Mouth and Throat  o Ears  o :   § External Ears  § : appearance within normal limits, no lesions present  o Nose  o :   § External Nose  § : appearance normal  · Respiratory  o Respiratory Effort  o : breathing unlabored  · Neurologic  o Mental Status Examination  o :   § Speech/Language  § : communication ability within normal limits  · Psychiatric  o Judgement and Insight  o : judgment and insight intact, judgement for everyday activities and social situations within normal limits, insight intact  o Mood and Affect  o : mood normal, affect appropriate          Results  · In-Office Procedures  o Lab procedure  § Automated " dipstick urinalysis with microscopy (22507)   § Color Ur: Yellow   § Clarity Ur: Clear   § Glucose Ur Ql Strip: Negative   § Bilirub Ur Ql Strip: Negative   § Ketones Ur Ql Strip: Negative   § Sp Gr Ur Qn: 1.025   § Hgb Ur Ql Strip: Large   § pH Ur-LsCnc: 5.5   § Prot Ur Ql Strip: Negative   § Urobilinogen Ur Strip-mCnc: 0.2   § Nitrite Ur Ql Strip: Negative   § WBC Est Ur Ql Strip: Negative   § RBC UrnS Qn HPF: 10   § WBC UrnS Qn HPF: 5-10   § Bacteria UrnS Qn HPF: 0   § Crystals UrnS Qn HPF: 0   § Epithelial Cells (non renal): 0 /HPF  § Epithelial Cells (renal): 0       Assessment  · Elevated PSA     790.93/R97.20  · Prostate cancer     185/C61  · Elevated prostate specific antigen (PSA)     790.93/R97.2    Problems Reconciled  Plan  · Orders  o CT ABD&PELV with and without contrast (09935) - 185/C61 - 07/14/2020  o Bone Scan (Whole Body) (41706) - 185/C61 - 07/14/2020  · Medications  o Medications have been Reconciled  o Transition of Care or Provider Policy  · Instructions  o Scheduled at [facility] 7/14/20 arriving at 7:30AM to register for a 8:00AM appointment. Nothing to eat/drink two hours prior to appointment  o DISCUSSION:  o We discussed his diagnosis of prostate cancer. Because of his high-grade he will need a metastatic work-up. Bone scan and CT scan have been scheduled. We also discussed options for treatment including radiation, brachii therapy, surgery, hormone therapy and active surveillance. We will discuss these again at his next visit we will may go over his metastatic work-up. I did also give the patient and his wife handouts in a book on prostate cancer.  o Electronically Identified Patient Education Materials Provided Electronically            Electronically Signed by: Dede Barker MD -Author on Eli 10, 2020 12:47:01 PM

## 2021-05-13 NOTE — PROGRESS NOTES
Progress Note      Patient Name: Lopez Nunez   Patient ID: [id number]   Sex: Male   YOB: 1940    Primary Care Provider: Joshua Ha MD   Referring Provider: Joshua Ha MD    Visit Date: August 10, 2020    Provider: Dede Barker MD   Location: [facility]   Location Address: [address] [place] [address]  [id number]   Location Phone: [phone]          Chief Complaint  · pt here for urologic issues      History Of Present Illness  The patient presents for follow-up of prostate cancer. The initial diagnosis was in July, 2020 and his initial treatment was laparoscopic robotic prostatectomy.   The PSA at diagnosis was known and it was 8.4. The biopsy Medhat's score was 3+4 and 4+3. The final Raleigh's score was 3+4 and 4+3. Pathologic stage on final diagnosis was known. It was T3a N0 M0. Surgical margins were negative. Perineural invasion was not reported. He has required no subsequent treatment for the cancer.   His PSA favio is unknown. His highest PSA value is unknown. His PSA trend is unknown. Prior imaging studies have been done. These include a bone scan and a CT scan and no metastases were detected.   Currently, he complains of no other bothersome symptoms.        Cystogram was normal and his catheter was removed one week out from surgery.       He has no new complaints.       Past Medical History  Chronic rhinitis; Dysphagia; Elevated PSA; Hypertension; Mixed conductive and sensorineural hearing loss of left ear; Myasthenia gravis; Parkinson's Disease; Prostate cancer; Sensorineural hearing loss (SNHL) of right ear; Tremors; Tympanic membrane perforation, left         Past Surgical History  Colonoscopy; Cyst Removal; EGD; Prostate Biopsy; Tonsilectomy         Medication List  amlodipine 10 mg oral tablet; aspirin 81 mg oral tablet,delayed release (DR/EC); CellCept 250 mg oral capsule; Centrum Silver 0.4-300-250 mg-mcg-mcg oral tablet; lisinopril 40 mg oral tablet; omeprazole  "20 mg oral capsule,delayed release(DR/EC); pramipexole 0.5 mg oral tablet; Vitamin D3 2,000 unit oral capsule         Allergy List  NO KNOWN DRUG ALLERGIES       Allergies Reconciled  Family Medical History  Family history of cancer; Family history of heart disease; Family history of diabetes mellitus         Social History  Alcohol (Light); Denies substance abuse (Never); ; Tobacco (Former); Working         Review of Systems  · Constitutional  o Denies  o : chills, fever  · Gastrointestinal  o Denies  o : nausea, vomiting, flank pain      Vitals  Date Time BP Position Site L\R Cuff Size HR RR TEMP (F) WT  HT  BMI kg/m2 BSA m2 O2 Sat HC       08/10/2020 10:57 /41 Sitting       171lbs 8oz 5'  11\" 23.92 1.97           Physical Examination  · Constitutional  o Appearance  o : well developed, in no acute distress  · Respiratory  o Respiratory Effort  o : breathing unlabored  · Gastrointestinal  o Abdominal Examination  o : non-distended abdomen, no abdominal tenderness, no masses present  o Liver and spleen  o : no hepatomegaly present, spleen not palpable  o Hernias  o : no hernias present          Results  · In-Office Procedures  o Lab procedure  § Automated dipstick urinalysis with microscopy (63173)   § Color Ur: Yellow   § Clarity Ur: Clear   § Glucose Ur Ql Strip: Negative   § Bilirub Ur Ql Strip: Negative   § Ketones Ur Ql Strip: Trace   § Sp Gr Ur Qn: 1.025   § Hgb Ur Ql Strip: Large   § pH Ur-LsCnc: 5.5   § Prot Ur Ql Strip: >=300   § Urobilinogen Ur Strip-mCnc: 1.0   § Nitrite Ur Ql Strip: Negative   § WBC Est Ur Ql Strip: Trace   § RBC UrnS Qn HPF: 10-20   § WBC UrnS Qn HPF: 0   § Bacteria UrnS Qn HPF: 0   § Crystals UrnS Qn HPF: 0   § Epithelial Cells (non renal): 0 /HPF  § Epithelial Cells (renal): 0       Assessment  · Prostate cancer     185/C61  · Urinary Incontinence     788.30/R32  · Erectile Dysfunction     607.84/N52.9      Plan  · Orders  o Prostate specific antigen (PSA); total (84050) " - 185/C61 - 10/10/2020  · Medications  o Medications have been Reconciled  o Transition of Care or Provider Policy  · Instructions  o DISCUSSION:  o At this point, there is no evidence of recurrent prostate cancer. The PSAs have remained low and there is no sign of recurrence. We will continue routine follow-up.  o PLAN:  o PSA in 3 months  o FOLLOW-UP:  o In 2 months  o Electronically Identified Patient Education Materials Provided Electronically            Electronically Signed by: Dede Barker MD -Author on August 10, 2020 12:14:00 PM

## 2021-05-13 NOTE — PROGRESS NOTES
Progress Note      Patient Name: Lopez Nunez   Patient ID: [id number]   Sex: Male   YOB: 1940    Primary Care Provider: Joshua Ha MD   Referring Provider: Joshua Ha MD    Visit Date: October 12, 2020    Provider: Dede Barker MD   Location: [facility]   Location Address: [address] [place] [address]  [id number]   Location Phone: [phone]          History Of Present Illness  The patient presents for follow-up of prostate cancer. The initial diagnosis was in July, 2020 and his initial treatment was laparoscopic robotic prostatectomy.   The PSA at diagnosis was known and it was 8.4. The biopsy Medhat's score was 3+4 and 4+3. The final Medhat's score was 3+4 and 4+3. Pathologic stage on final diagnosis was known. It was T3a N0 M0. Surgical margins were negative. Perineural invasion was not reported. He has required no subsequent treatment for the cancer.   His PSA favio is known. His psa favio was 0.01. His highest PSA value is known. The highest PSA value was 0.01. His PSA trend is undetectable. Prior imaging studies have been done. These include a bone scan and a CT scan and no metastases were detected.   Currently, he complains of no other bothersome symptoms.        Cystogram was normal and his catheter was removed one week out from surgery.     He had a positive culture one week ago.  He has no new issues.       He has no new complaints.  His urinary control is much better over the last month or so.  He was able to go about 4 hours walking with only a liner and it worked well.   TELEHEALTH TELEPHONE VISIT  Lopez Nunez is a 80 year old /White male who is presenting for evaluation via telehealth telephone visit. Verbal consent obtained before beginning visit.   Provider spent 5 minutes with the patient during the telehealth visit.   The following staff were present during this visit: Theresa Saha and Dede Barker MD       Past Medical  History  Chronic rhinitis; Dysphagia; Elevated PSA; Hypertension; Mixed conductive and sensorineural hearing loss of left ear; Myasthenia gravis; Parkinson's Disease; Prostate cancer; Sensorineural hearing loss (SNHL) of right ear; Tremors; Tympanic membrane perforation, left         Past Surgical History  Colonoscopy; Cyst Removal; EGD; Prostate Biopsy; Tonsilectomy         Medication List  amlodipine 10 mg oral tablet; aspirin 81 mg oral tablet,delayed release (DR/EC); Bactrim -160 mg oral tablet; CellCept 250 mg oral capsule; Centrum Silver 0.4-300-250 mg-mcg-mcg oral tablet; lisinopril 40 mg oral tablet; omeprazole 20 mg oral capsule,delayed release(DR/EC); pramipexole 0.5 mg oral tablet; Vitamin D3 2,000 unit oral capsule         Allergy List  NO KNOWN DRUG ALLERGIES       Allergies Reconciled  Family Medical History  Family history of cancer; Family history of heart disease; Family history of diabetes mellitus         Social History  Alcohol (Light); Denies substance abuse (Never); ; Tobacco (Former); Working         Review of Systems  · Constitutional  o Denies  o : fatigue, fever  · Gastrointestinal  o Denies  o : nausea, vomiting              Assessment  · Prostate cancer     185/C61  · Urinary Incontinence     788.30/R32      Plan  · Orders  o Prostate specific antigen (PSA); total (31496) - 185/C61 - 01/04/2021  o Physican Telephone evaluation, 5-10 min (78049) - 788.30/R32, 185/C61 - 10/12/2020  · Medications  o Medications have been Reconciled  o Transition of Care or Provider Policy  · Instructions  o DISCUSSION:  o At this point, there is no evidence of recurrent prostate cancer. The PSAs have remained low and there is no sign of recurrence. We will continue routine follow-up.  o PLAN:  o PSA in 3 months  o FOLLOW-UP:  o In 3 months, phone visit with PSA prior.             Electronically Signed by: Dede Barker MD -Author on October 12, 2020 06:05:38 PM

## 2021-05-13 NOTE — PROGRESS NOTES
"   Progress Note      Patient Name: Lopez Nunez   Patient ID: [id number]   Sex: Male   YOB: 1940    Primary Care Provider: Joshua Ha MD   Referring Provider: Joshua Ha MD    Visit Date: July 22, 2020    Provider: Dede Barker MD   Location: [facility]   Location Address: [address] [place] [address]  [id number]   Location Phone: [phone]          Chief Complaint  · post-op cystogram  · \"Returning for a check up after my prostate cancer treatment\"      History Of Present Illness  The patient presents for follow-up of prostate cancer. The initial diagnosis was in July, 2020 and his initial treatment was laparoscopic robotic prostatectomy.   The PSA at diagnosis was known and it was 8.4. The biopsy Howe's score was 3+4 and 4+3. The final Medhat's score was 3+4 and 4+3. Pathologic stage on final diagnosis was known. It was T3a N0 M0. Surgical margins were negative. Perineural invasion was not reported. He has required no subsequent treatment for the cancer.   His PSA favio is unknown. His highest PSA value is unknown. His PSA trend is unknown. Prior imaging studies have been done. These include a bone scan and a CT scan and no metastases were detected.   Currently, he complains of no other bothersome symptoms.        Cystogram was normal and his catheter was removed today.       Past Medical History  Chronic rhinitis; Dysphagia; Elevated PSA; Hypertension; Mixed conductive and sensorineural hearing loss of left ear; Myasthenia gravis; Parkinson's Disease; Prostate cancer; Sensorineural hearing loss (SNHL) of right ear; Tremors; Tympanic membrane perforation, left         Past Surgical History  Colonoscopy; Cyst Removal; EGD; Prostate Biopsy; Tonsilectomy         Medication List  amlodipine 10 mg oral tablet; aspirin 81 mg oral tablet,delayed release (DR/EC); CellCept 250 mg oral capsule; Centrum Silver 0.4-300-250 mg-mcg-mcg oral tablet; lisinopril 40 mg oral tablet; omeprazole " "20 mg oral capsule,delayed release(DR/EC); pramipexole 0.5 mg oral tablet; Vitamin D3 2,000 unit oral capsule         Allergy List  NO KNOWN DRUG ALLERGIES         Family Medical History  Family history of cancer; Family history of heart disease; Family history of diabetes mellitus         Social History  Alcohol (Light); Denies substance abuse (Never); ; Tobacco (Former); Working         Review of Systems  · Constitutional  o Denies  o : chills, fever  · Gastrointestinal  o Denies  o : nausea, vomiting      Vitals  Date Time BP Position Site L\R Cuff Size HR RR TEMP (F) WT  HT  BMI kg/m2 BSA m2 O2 Sat HC       07/22/2020 10:30 /41 Sitting       174lbs 16oz 5'  11\" 24.41 1.99           Physical Examination  · Constitutional  o Appearance  o : well developed, in no acute distress  · Respiratory  o Respiratory Effort  o : breathing unlabored  · Gastrointestinal  o Abdominal Examination  o : non-distended abdomen, no abdominal tenderness, no masses present  o Liver and spleen  o : no hepatomegaly present, spleen not palpable  o Hernias  o : no hernias present              Assessment  · Prostate cancer     185/C61  · Urinary Incontinence     788.30/R32  · Erectile Dysfunction     607.84/N52.9      Plan  · Orders  o Prostate specific antigen (PSA); total (78079) - 185/C61 - 10/22/2020  · Medications  o Medications have been Reconciled  o Transition of Care or Provider Policy  · Instructions  o DISCUSSION:  o At this point, there is no evidence of recurrent prostate cancer. The PSAs have remained low and there is no sign of recurrence. We will continue routine follow-up.  o PLAN:  o Repeat PSA in 3 months  o FOLLOW-UP:  o In 1 month  o Electronically Identified Patient Education Materials Provided Electronically            Electronically Signed by: Dede Barker MD -Author on July 22, 2020 12:58:55 PM  "

## 2021-05-13 NOTE — PROGRESS NOTES
Progress Note      Patient Name: Lopez Nunez   Patient ID: [id number]   Sex: Male   YOB: 1940    Primary Care Provider: Joshua Ha MD   Referring Provider: Joshua Ha MD    Visit Date: June 17, 2020    Provider: Dede Barker MD   Location: [facility]   Location Address: [address] [place] [address]  [id number]   Location Phone: [phone]          Chief Complaint  · pt here for urologic issues      History Of Present Illness  The patient returns for a routine follow-up after a previous visit approximately 4 years ago, for a follow-up elevated PSA. The current PSA level is 8.40. The patient states he has no urinary complaints of hesitancy, nocturia, frequency or decrease in stream.   The patient is currently not taking any Urology specific medications.   To date, besides the PSA level, no other diagnostic studies have been performed. He has not had any change in his care since last visit.      He has had two previous prostate biopsies, the first of which showed ASAP and the second which was negative.  That was about 6 years ago.     He has had no change in symptoms.      His most recent PSA was 8.40.  It has been between 5 and 6 for the last five years but now is significantly changed.    He had a prostate MRI and that showed an area concerning for prostate cancer.  He had a prostate biopsy in the OR last week.  There was no prostate cancer on the right side.  His left-sided biopsies 2 of those were positive for Medhat 4+4 prostate cancer.  The region of interest was positive in 4 out of 4 biopsies for Medhat 4+4 prostate cancer.  We went over these results with him today.           Past Medical History  Chronic rhinitis; Dysphagia; Elevated PSA; Hypertension; Mixed conductive and sensorineural hearing loss of left ear; Myasthenia gravis; Parkinson's Disease; Prostate cancer; Sensorineural hearing loss (SNHL) of right ear; Tremors; Tympanic membrane perforation, left  "        Past Surgical History  Colonoscopy; Cyst Removal; EGD; Prostate Biopsy; Tonsilectomy         Medication List  amlodipine 10 mg oral tablet; aspirin 81 mg oral tablet,delayed release (DR/EC); CellCept 250 mg oral capsule; Centrum Silver 0.4-300-250 mg-mcg-mcg oral tablet; lisinopril 40 mg oral tablet; omeprazole 20 mg oral capsule,delayed release(DR/EC); pramipexole 0.5 mg oral tablet; Vitamin D3 2,000 unit oral capsule         Allergy List  NO KNOWN DRUG ALLERGIES       Allergies Reconciled  Family Medical History  Family history of cancer; Family history of heart disease; Family history of diabetes mellitus         Social History  Alcohol (Light); Denies substance abuse (Never); ; Tobacco (Former); Working         Review of Systems  · Constitutional  o Denies  o : chills, fever  · Gastrointestinal  o Denies  o : nausea, vomiting      Vitals  Date Time BP Position Site L\R Cuff Size HR RR TEMP (F) WT  HT  BMI kg/m2 BSA m2 O2 Sat        06/17/2020 04:03 /54 Sitting       177lbs 2oz 5'  11\" 24.7 2.01           Physical Examination  · Constitutional  o Appearance  o : well-nourished, well developed, alert, in no acute distress  · Head and Face  o Head  o :   § Inspection  § : atraumatic, normocephalic  o Face  o :   § Inspection  § : no facial lesions  · Eyes  o Sclerae  o : sclerae white  · Ears, Nose, Mouth and Throat  o Ears  o :   § External Ears  § : appearance within normal limits, no lesions present  o Nose  o :   § External Nose  § : appearance normal  · Respiratory  o Respiratory Effort  o : breathing unlabored  · Neurologic  o Mental Status Examination  o :   § Speech/Language  § : communication ability within normal limits  · Psychiatric  o Judgement and Insight  o : judgment and insight intact, judgement for everyday activities and social situations within normal limits, insight intact  o Mood and Affect  o : mood normal, affect appropriate          Assessment  · Elevated " PSA     790.93/R97.20  · Prostate cancer     185/C61  · Elevated prostate specific antigen (PSA)     790.93/R97.2      Plan  · Orders  o CT ABD&PELV with and without contrast (64933) - 185/C61 - 06/17/2020  o Bone Scan (Whole Body) (56105) - 185/C61 - 06/17/2020  · Medications  o Medications have been Reconciled  o Transition of Care or Provider Policy  · Instructions  o Scheduled at [facility] 7/14/20 arriving at 7:30AM to register for a 8:00AM appointment. Nothing to eat/drink two hours prior to appointment  o DISCUSSION:  o We discussed his diagnosis of prostate cancer. Because of his high-grade he will need a metastatic work-up. Bone scan and CT scan have been scheduled. We also discussed options for treatment including radiation, brachii therapy, surgery, hormone therapy and active surveillance. We will discuss these again at his next visit we will may go over his metastatic work-up. I did also give the patient and his wife handouts in a book on prostate cancer.  o Electronically Identified Patient Education Materials Provided Electronically            Electronically Signed by: Iliana Lee-, -Author on July 20, 2020 09:31:17 AM  Electronically Co-signed by: Dede Barker MD -Reviewer on July 20, 2020 11:42:50 AM

## 2021-05-14 VITALS
TEMPERATURE: 98.3 F | BODY MASS INDEX: 23.4 KG/M2 | DIASTOLIC BLOOD PRESSURE: 51 MMHG | HEIGHT: 71 IN | SYSTOLIC BLOOD PRESSURE: 152 MMHG | WEIGHT: 167.12 LBS

## 2021-05-14 VITALS
WEIGHT: 167 LBS | DIASTOLIC BLOOD PRESSURE: 52 MMHG | HEIGHT: 71 IN | HEART RATE: 46 BPM | SYSTOLIC BLOOD PRESSURE: 143 MMHG | TEMPERATURE: 95.9 F | BODY MASS INDEX: 23.38 KG/M2

## 2021-05-14 NOTE — PROGRESS NOTES
Progress Note      Patient Name: Lopez Nunez   Patient ID: [id number]   Sex: Male   YOB: 1940    Primary Care Provider: Joshua Ha MD   Referring Provider: Joshua Ha MD    Visit Date: April 14, 2021    Provider: Dede Barker MD   Location: [facility]   Location Address: [address] [place] [address]  [id number]   Location Phone: [phone]          History Of Present Illness  The patient presents for follow-up of prostate cancer. The initial diagnosis was in July, 2020 and his initial treatment was laparoscopic robotic prostatectomy.   The PSA at diagnosis was known and it was 8.4. The biopsy Medhat's score was 3+4 and 4+3. The final Medhat's score was 3+4 and 4+3. Pathologic stage on final diagnosis was known. It was T3a N0 M0. Surgical margins were negative. Perineural invasion was not reported. He has required no subsequent treatment for the cancer.   His PSA favio is known. His psa favio was 0.01. His highest PSA value is known. The highest PSA value was 0.01. His PSA trend is undetectable. Prior imaging studies have been done. These include a bone scan and a CT scan and no metastases were detected.   Currently, he complains of no other bothersome symptoms.        Cystogram was normal and his catheter was removed one week out from surgery.     He has no new complaints.  His urinary control is much better over the last month or so.  He states it is much improved.  He is almost 80-90% dry.          PSA  *************  01/21   0.01  04/21   0.01   Lopez Nunez is a 80 year old /White male who is presenting for evaluation via video conferencing. Verbal consent obtained before beginning visit.   The following staff were present during this visit: Theresa Saha MA       Allergy List    Allergies Reconciled  Physical Examination  · Constitutional  o Appearance  o : well-nourished, well developed, alert, in no acute distress, normal body  habitus  · Respiratory  o Respiratory Effort  o : breathing unlabored  · Psychiatric  o Judgement and Insight  o : judgment and insight intact, judgement for everyday activities and social situations within normal limits, insight intact  o Mood and Affect  o : mood normal, affect appropriate              Assessment  · Prostate cancer     185/C61  · Urinary Incontinence     788.30/R32      Plan  · Orders  o Prostate specific antigen (PSA); total (05679) - 185/C61 - 07/14/2021  · Medications  o Medications have been Reconciled  o Transition of Care or Provider Policy  · Instructions  o He has mild stress urinary incontinence due to his radical prostatectomy. He is less than one year out from surgery so conservative treatment is the best option. He will continue to perform his Kegel exercises.   o DISCUSSION:  o At this point, there is no evidence of recurrent prostate cancer. The PSAs have remained low and there is no sign of recurrence. We will continue routine follow-up.  o PLAN:  o Repeat PSA in 3 months  o In 3 months, video visit with PSA prior.             Electronically Signed by: Dede Barker MD -Author on April 14, 2021 03:44:48 PM

## 2021-05-14 NOTE — PROGRESS NOTES
Progress Note      Patient Name: Lopez Nunez   Patient ID: [id number]   Sex: Male   YOB: 1940    Primary Care Provider: Joshua Ha MD   Referring Provider: Joshua Ha MD    Visit Date: January 6, 2021    Provider: Dede Barker MD   Location: [facility]   Location Address: [address] [place] [address]  [id number]   Location Phone: [phone]          History Of Present Illness  The patient presents for follow-up of prostate cancer. The initial diagnosis was in July, 2020 and his initial treatment was laparoscopic robotic prostatectomy.   The PSA at diagnosis was known and it was 8.4. The biopsy Cheyenne's score was 3+4 and 4+3. The final Medhat's score was 3+4 and 4+3. Pathologic stage on final diagnosis was known. It was T3a N0 M0. Surgical margins were negative. Perineural invasion was not reported. He has required no subsequent treatment for the cancer.   His PSA favio is known. His psa favio was 0.01. His highest PSA value is known. The highest PSA value was 0.01. His PSA trend is undetectable. Prior imaging studies have been done. These include a bone scan and a CT scan and no metastases were detected.   Currently, he complains of no other bothersome symptoms.        Cystogram was normal and his catheter was removed one week out from surgery.     He has no new complaints.  His urinary control is much better over the last month or so.  He states it is much improved.  He is almost 80-90% dry.          PSA  *************  01/21   0.01     Lopez Nunez is a 80 year old /White male who is presenting for evaluation via video conferencing. Verbal consent obtained before beginning visit.   The following staff were present during this visit: Theresa Saha MA       Past Medical History  Chronic rhinitis; Dysphagia; Elevated PSA; Hypertension; Mixed conductive and sensorineural hearing loss of left ear; Myasthenia gravis; Parkinson's Disease; Prostate cancer;  Sensorineural hearing loss (SNHL) of right ear; Tremors; Tympanic membrane perforation, left         Past Surgical History  Colonoscopy; Cyst Removal; EGD; Prostate Biopsy; Tonsilectomy         Medication List  amlodipine 10 mg oral tablet; aspirin 81 mg oral tablet,delayed release (DR/EC); CellCept 250 mg oral capsule; Centrum Silver 0.4-300-250 mg-mcg-mcg oral tablet; lisinopril 40 mg oral tablet; pramipexole 0.75 mg oral tablet; Vitamin D3 2,000 unit oral capsule         Allergy List  NO KNOWN DRUG ALLERGIES         Family Medical History  Family history of cancer; Family history of heart disease; Family history of diabetes mellitus         Social History  Alcohol (Light); Denies substance abuse (Never); ; Tobacco (Former); Working         Physical Examination  · Constitutional  o Appearance  o : well-nourished, well developed, alert, in no acute distress, normal body habitus  · Respiratory  o Respiratory Effort  o : breathing unlabored  · Psychiatric  o Judgement and Insight  o : judgment and insight intact, judgement for everyday activities and social situations within normal limits, insight intact  o Mood and Affect  o : mood normal, affect appropriate              Assessment  · Prostate cancer     185/C61  · Urinary Incontinence     788.30/R32      Plan  · Orders  o Prostate specific antigen (PSA); total (31390) - 185/C61 - 04/06/2021  · Instructions  o He has mild stress urinary incontinence due to his radical prostatectomy. He is less than one year out from surgery so conservative treatment is the best option. He will continue to perform his Kegel exercises.   o DISCUSSION:  o At this point, there is no evidence of recurrent prostate cancer. The PSAs have remained low and there is no sign of recurrence. We will continue routine follow-up.  o PLAN:  o Repeat PSA in 3 months  o In 3 months, video visit with PSA prior.             Electronically Signed by: Iliana Lee-, -Author on January  15, 2021 02:00:22 PM  Electronically Co-signed by: Dede Barker MD -Reviewer on January 18, 2021 01:42:56 PM

## 2021-05-15 VITALS
BODY MASS INDEX: 24.5 KG/M2 | WEIGHT: 175 LBS | SYSTOLIC BLOOD PRESSURE: 146 MMHG | HEIGHT: 71 IN | DIASTOLIC BLOOD PRESSURE: 41 MMHG

## 2021-05-15 VITALS
BODY MASS INDEX: 24.55 KG/M2 | HEIGHT: 72 IN | OXYGEN SATURATION: 100 % | HEART RATE: 55 BPM | RESPIRATION RATE: 16 BRPM | SYSTOLIC BLOOD PRESSURE: 149 MMHG | DIASTOLIC BLOOD PRESSURE: 50 MMHG | WEIGHT: 181.25 LBS | TEMPERATURE: 97.8 F

## 2021-05-15 VITALS
BODY MASS INDEX: 24.8 KG/M2 | HEIGHT: 71 IN | WEIGHT: 177.12 LBS | SYSTOLIC BLOOD PRESSURE: 165 MMHG | DIASTOLIC BLOOD PRESSURE: 54 MMHG

## 2021-05-15 VITALS
HEIGHT: 72 IN | BODY MASS INDEX: 24.67 KG/M2 | SYSTOLIC BLOOD PRESSURE: 153 MMHG | RESPIRATION RATE: 16 BRPM | DIASTOLIC BLOOD PRESSURE: 58 MMHG | TEMPERATURE: 98.2 F | HEART RATE: 57 BPM | OXYGEN SATURATION: 98 % | WEIGHT: 182.12 LBS

## 2021-05-15 VITALS
DIASTOLIC BLOOD PRESSURE: 50 MMHG | WEIGHT: 178 LBS | BODY MASS INDEX: 24.92 KG/M2 | HEIGHT: 71 IN | SYSTOLIC BLOOD PRESSURE: 186 MMHG

## 2021-05-15 VITALS
HEIGHT: 72 IN | DIASTOLIC BLOOD PRESSURE: 46 MMHG | WEIGHT: 180 LBS | BODY MASS INDEX: 24.38 KG/M2 | SYSTOLIC BLOOD PRESSURE: 152 MMHG | HEART RATE: 48 BPM

## 2021-05-15 VITALS
DIASTOLIC BLOOD PRESSURE: 41 MMHG | SYSTOLIC BLOOD PRESSURE: 167 MMHG | BODY MASS INDEX: 24.01 KG/M2 | HEIGHT: 71 IN | WEIGHT: 171.5 LBS

## 2021-05-15 VITALS
WEIGHT: 175 LBS | DIASTOLIC BLOOD PRESSURE: 51 MMHG | SYSTOLIC BLOOD PRESSURE: 149 MMHG | HEIGHT: 72 IN | BODY MASS INDEX: 23.7 KG/M2

## 2021-05-15 VITALS
HEART RATE: 57 BPM | HEIGHT: 72 IN | BODY MASS INDEX: 24.24 KG/M2 | SYSTOLIC BLOOD PRESSURE: 150 MMHG | DIASTOLIC BLOOD PRESSURE: 51 MMHG | WEIGHT: 179 LBS

## 2021-05-15 VITALS
BODY MASS INDEX: 25.22 KG/M2 | WEIGHT: 180.12 LBS | SYSTOLIC BLOOD PRESSURE: 137 MMHG | DIASTOLIC BLOOD PRESSURE: 57 MMHG | HEIGHT: 71 IN | HEART RATE: 57 BPM

## 2021-05-16 VITALS
SYSTOLIC BLOOD PRESSURE: 174 MMHG | RESPIRATION RATE: 16 BRPM | DIASTOLIC BLOOD PRESSURE: 58 MMHG | OXYGEN SATURATION: 99 % | TEMPERATURE: 98.2 F | HEART RATE: 54 BPM

## 2021-05-16 VITALS
SYSTOLIC BLOOD PRESSURE: 163 MMHG | DIASTOLIC BLOOD PRESSURE: 51 MMHG | HEART RATE: 55 BPM | BODY MASS INDEX: 25.06 KG/M2 | WEIGHT: 185 LBS | HEIGHT: 72 IN

## 2021-07-12 ENCOUNTER — TRANSCRIBE ORDERS (OUTPATIENT)
Dept: LAB | Facility: HOSPITAL | Age: 81
End: 2021-07-12

## 2021-07-12 ENCOUNTER — LAB (OUTPATIENT)
Dept: LAB | Facility: HOSPITAL | Age: 81
End: 2021-07-12

## 2021-07-12 DIAGNOSIS — C61 PROSTATE CANCER (HCC): Primary | ICD-10-CM

## 2021-07-12 DIAGNOSIS — C61 PROSTATE CANCER (HCC): ICD-10-CM

## 2021-07-12 PROCEDURE — 36415 COLL VENOUS BLD VENIPUNCTURE: CPT

## 2021-07-12 PROCEDURE — 84066 ASSAY PROSTATE PHOSPHATASE: CPT

## 2021-07-14 LAB — PACP SER-MCNC: 0.8 NG/ML (ref 0–3.5)

## 2021-07-20 RX ORDER — MULTIPLE VITAMINS W/ MINERALS TAB 9MG-400MCG
TAB ORAL
COMMUNITY

## 2021-07-20 RX ORDER — LISINOPRIL 40 MG/1
TABLET ORAL
COMMUNITY
End: 2021-07-27

## 2021-07-20 RX ORDER — AMLODIPINE BESYLATE 10 MG/1
TABLET ORAL
COMMUNITY
End: 2021-07-27

## 2021-07-20 RX ORDER — PRAMIPEXOLE DIHYDROCHLORIDE 0.75 MG/1
0.75 TABLET ORAL 4 TIMES DAILY
COMMUNITY
End: 2021-07-23

## 2021-07-20 RX ORDER — CETIRIZINE HYDROCHLORIDE 10 MG/1
TABLET ORAL
COMMUNITY

## 2021-07-20 RX ORDER — FLUTICASONE PROPIONATE 50 MCG
SPRAY, SUSPENSION (ML) NASAL
COMMUNITY
End: 2021-11-04

## 2021-07-20 RX ORDER — ASPIRIN 81 MG/1
81 TABLET ORAL
COMMUNITY

## 2021-07-20 RX ORDER — OMEPRAZOLE 20 MG/1
CAPSULE, DELAYED RELEASE ORAL
COMMUNITY
End: 2021-11-04 | Stop reason: SDUPTHER

## 2021-07-21 ENCOUNTER — OFFICE VISIT (OUTPATIENT)
Dept: UROLOGY | Facility: CLINIC | Age: 81
End: 2021-07-21

## 2021-07-21 DIAGNOSIS — C61 PROSTATE CANCER (HCC): Primary | ICD-10-CM

## 2021-07-21 PROCEDURE — 99441 PR PHYS/QHP TELEPHONE EVALUATION 5-10 MIN: CPT | Performed by: UROLOGY

## 2021-07-21 NOTE — PROGRESS NOTES
Chief Complaint  No chief complaint on file.    Subjective          Lopez Nunez presents to [facility]  The patient presents for follow-up of prostate cancer. The initial diagnosis was in July, 2020 and his initial treatment was laparoscopic robotic prostatectomy.     The PSA at diagnosis was known and it was 8.4. The biopsy Medhat's score was 3+4 and 4+3. The final Medhat's score was 3+4 and 4+3. Pathologic stage on final diagnosis was known. It was T3a N0 M0. Surgical margins were negative. Perineural invasion was not reported. He has required no subsequent treatment for the cancer.     His PSA favio is known. His psa favio was 0.01. His highest PSA value is known. The highest PSA value was 0.01. His PSA trend is undetectable. Prior imaging studies have been done. These include a bone scan and a CT scan and no metastases were detected.     Currently, he complains of no other bothersome symptoms.       He has no new complaints.  His urinary control is much better over the last month or so.  He states it is much improved.  He is almost 80-90% dry.        PSA  ----------------------  01/21   0.01  04/21   0.01  05/21   0.01    Lopez Nunez is a 80 year old /White male who is presenting for evaluation via video conferencing. Verbal consent obtained before beginning visit. Phone visit was 5 minutes.     The following staff were present during this visit: Theresa Saha MA       Objective   Vital Signs:   There were no vitals taken for this visit.      Result Review :                 Assessment and Plan    Diagnoses and all orders for this visit:    1. Prostate cancer (CMS/HCC) (Primary)  Assessment & Plan:  PSA now and again in 3 months.     Orders:  -     PSA DIAGNOSTIC; Future  -     PSA DIAGNOSTIC; Future      Follow Up   No follow-ups on file.  Patient was given instructions and counseling regarding his condition or for health maintenance advice. Please see specific  information pulled into the AVS if appropriate.

## 2021-07-22 ENCOUNTER — LAB (OUTPATIENT)
Dept: LAB | Facility: HOSPITAL | Age: 81
End: 2021-07-22

## 2021-07-22 DIAGNOSIS — C61 PROSTATE CANCER (HCC): ICD-10-CM

## 2021-07-22 DIAGNOSIS — C61 PROSTATE CANCER (HCC): Primary | ICD-10-CM

## 2021-07-22 LAB — PSA SERPL-MCNC: 0.01 NG/ML (ref 0–4)

## 2021-07-22 PROCEDURE — 36415 COLL VENOUS BLD VENIPUNCTURE: CPT

## 2021-07-22 PROCEDURE — 84153 ASSAY OF PSA TOTAL: CPT

## 2021-07-23 ENCOUNTER — OFFICE VISIT (OUTPATIENT)
Dept: NEUROLOGY | Facility: CLINIC | Age: 81
End: 2021-07-23

## 2021-07-23 VITALS
HEART RATE: 47 BPM | BODY MASS INDEX: 23.94 KG/M2 | DIASTOLIC BLOOD PRESSURE: 53 MMHG | SYSTOLIC BLOOD PRESSURE: 154 MMHG | HEIGHT: 71 IN | WEIGHT: 171 LBS

## 2021-07-23 DIAGNOSIS — G70.00 OCULAR MYASTHENIA GRAVIS (HCC): ICD-10-CM

## 2021-07-23 DIAGNOSIS — G20 PARKINSON'S DISEASE (HCC): Primary | ICD-10-CM

## 2021-07-23 PROBLEM — G20.A1 PARKINSON'S DISEASE: Status: ACTIVE | Noted: 2021-07-23

## 2021-07-23 PROCEDURE — 99214 OFFICE O/P EST MOD 30 MIN: CPT | Performed by: PSYCHIATRY & NEUROLOGY

## 2021-07-23 RX ORDER — PRAMIPEXOLE DIHYDROCHLORIDE 0.5 MG/1
TABLET ORAL
Qty: 270 TABLET | Refills: 2 | Status: SHIPPED | OUTPATIENT
Start: 2021-07-23 | End: 2021-09-23 | Stop reason: SDUPTHER

## 2021-07-23 NOTE — ASSESSMENT & PLAN NOTE
I will increase his pramipexole to take at 7 AM, 12 noon, 4 PM.  He is to take 1 mg 3 times a day for 1 week and increase the dose as needed by 0.5 mg weekly until he is taking up to 1.5 mg 3 times a day.  I explained to them the adverse effects.  He is to stay at the lowest dose that controls his tremors.  I will schedule for him to have a DaTscan.  If the DaTscan is positive I will change him to carbidopa/levodopa if there is no improvement of his symptoms.  25 I will see him again in 2 months time for follow-up.

## 2021-07-23 NOTE — PROGRESS NOTES
"Chief Complaint  Tremors    Subjective          Lopez Nunez is a 81 y.o. male who presents to [facility]  History of Present Illness  81-year-old man here for follow-up of his hand tremor.  The left hand is worse.  He is taking pramipexole 0.75 mg 3 times a day 2 at 7 AM, 1 at 2 PM and at bedtime.  He has been diagnosed in the past to have essential tremor.  He has never had a DaTscan in the past.    His ocular myasthenia is stable.  He is taking CellCept.  He had recent laboratory work-up.    Objective   Vital Signs:   /53 (BP Location: Right arm, Patient Position: Sitting, Cuff Size: Adult)   Pulse (!) 47   Ht 180.3 cm (70.98\")   Wt 77.6 kg (171 lb)   BMI 23.86 kg/m²     Physical Exam   He is alert, fluent, phasic, follows commands well.  There is no facial weakness.  There is a resting tremor in the left arm parkinsonian frequency greater than the right arm.  There is mild rigidity in the left arm.  On Station and gait he is able to tiptoe, heel walk and joel with normal arm swing but there is bilateral tremor noted in his thumb and index finger.  Heart is regular rhythm normal in rate.        Assessment and Plan  Diagnoses and all orders for this visit:    1. Parkinson's disease (CMS/[facility]) (Primary)  Assessment & Plan:  I will increase his pramipexole to take at 7 AM, 12 noon, 4 PM.  He is to take 1 mg 3 times a day for 1 week and increase the dose as needed by 0.5 mg weekly until he is taking up to 1.5 mg 3 times a day.  I explained to them the adverse effects.  He is to stay at the lowest dose that controls his tremors.  I will schedule for him to have a DaTscan.  If the DaTscan is positive I will change him to carbidopa/levodopa if there is no improvement of his symptoms.  25 I will see him again in 2 months time for follow-up.    Orders:  -     NM Brain DaTScan; Future    2. Ocular myasthenia gravis (CMS/[facility])  Assessment & Plan:  He is to continue " taking CellCept.      Other orders  -     pramipexole (Mirapex) 0.5 MG tablet; Titrate weekly and take up to 3 tablets 3 times a day.  Dispense: 270 tablet; Refill: 2       Total time spent with the patient and coordinating patient care was 25 minutes.    Follow Up  No follow-ups on file.  Patient was given instructions and counseling regarding his condition or for health maintenance advice. Please see specific information pulled into the AVS if appropriate.

## 2021-07-26 ENCOUNTER — TELEPHONE (OUTPATIENT)
Dept: UROLOGY | Facility: CLINIC | Age: 81
End: 2021-07-26

## 2021-07-26 NOTE — TELEPHONE ENCOUNTER
Discussed results with Dr. Barkre then called  Enrique. Notified him that Dr. Barker felt results are normal, but we will repeat PSA in three months along with appointment to see her. I mailed patient that three month appointment and PSA order today. He will call if any other questions or concerns prior to that appointment.

## 2021-07-26 NOTE — TELEPHONE ENCOUNTER
Patient wife pradeep called and said someone was suppose to call with PSA results. Haven't heard anything.

## 2021-07-28 RX ORDER — AMLODIPINE BESYLATE 10 MG/1
TABLET ORAL
Qty: 90 TABLET | Refills: 1 | Status: SHIPPED | OUTPATIENT
Start: 2021-07-28 | End: 2022-01-24

## 2021-07-28 RX ORDER — LISINOPRIL 40 MG/1
TABLET ORAL
Qty: 90 TABLET | Refills: 1 | Status: SHIPPED | OUTPATIENT
Start: 2021-07-28 | End: 2022-01-24

## 2021-08-18 ENCOUNTER — TELEPHONE (OUTPATIENT)
Dept: INTERNAL MEDICINE | Facility: CLINIC | Age: 81
End: 2021-08-18

## 2021-08-18 NOTE — TELEPHONE ENCOUNTER
Yes, if the Pfizer booster shot is available locally, he should look into getting this.  Not sure if the hospital has a hotline for this, or if he just needs to contact his pharmacy.  Please let the patient know.

## 2021-08-18 NOTE — TELEPHONE ENCOUNTER
Patient's spouse called wanting to know if he needed to get the booster shot due to him being on CellCept and his medical history. He had the Pfizer vaccines, call back number is [phone] okay to leave message.

## 2021-09-10 ENCOUNTER — LAB (OUTPATIENT)
Dept: LAB | Facility: HOSPITAL | Age: 81
End: 2021-09-10

## 2021-09-10 ENCOUNTER — TELEPHONE (OUTPATIENT)
Dept: UROLOGY | Facility: CLINIC | Age: 81
End: 2021-09-10

## 2021-09-10 DIAGNOSIS — N30.00 ACUTE CYSTITIS WITHOUT HEMATURIA: Primary | ICD-10-CM

## 2021-09-10 DIAGNOSIS — C61 PROSTATE CANCER (HCC): Primary | ICD-10-CM

## 2021-09-10 DIAGNOSIS — C61 PROSTATE CANCER (HCC): ICD-10-CM

## 2021-09-10 DIAGNOSIS — R32 URINARY INCONTINENCE, UNSPECIFIED TYPE: ICD-10-CM

## 2021-09-10 PROCEDURE — 87086 URINE CULTURE/COLONY COUNT: CPT

## 2021-09-10 RX ORDER — SULFAMETHOXAZOLE AND TRIMETHOPRIM 800; 160 MG/1; MG/1
1 TABLET ORAL 2 TIMES DAILY
Qty: 14 TABLET | Refills: 0 | Status: SHIPPED | OUTPATIENT
Start: 2021-09-10 | End: 2021-09-17

## 2021-09-10 NOTE — TELEPHONE ENCOUNTER
called with complaints of increased leakage and believes he has UTI and requests antibiotic.He is not having any burning with urination, dysuria, or cloudy urine.   I explained to him that  usually requires a urine culture prior to sending in antibiotics. I told him I would send message to  to get her recommendation.

## 2021-09-10 NOTE — TELEPHONE ENCOUNTER
Notified  that  is sending in antibiotics, but she wants him to do his urine culture specimen before starting the antibiotics.  He understands that I will call him on Monday IF his culture is positive and IF the antibiotics that  is sending in today are not appropriate and need to be changed.

## 2021-09-11 LAB — BACTERIA SPEC AEROBE CULT: NORMAL

## 2021-09-23 ENCOUNTER — OFFICE VISIT (OUTPATIENT)
Dept: NEUROLOGY | Facility: CLINIC | Age: 81
End: 2021-09-23

## 2021-09-23 VITALS
BODY MASS INDEX: 23.66 KG/M2 | HEIGHT: 71 IN | SYSTOLIC BLOOD PRESSURE: 175 MMHG | DIASTOLIC BLOOD PRESSURE: 53 MMHG | HEART RATE: 49 BPM | WEIGHT: 169 LBS

## 2021-09-23 DIAGNOSIS — G20 PARKINSON'S DISEASE (HCC): Primary | ICD-10-CM

## 2021-09-23 PROCEDURE — 99213 OFFICE O/P EST LOW 20 MIN: CPT | Performed by: PSYCHIATRY & NEUROLOGY

## 2021-09-23 RX ORDER — PRAMIPEXOLE DIHYDROCHLORIDE 0.5 MG/1
TABLET ORAL
Qty: 270 TABLET | Refills: 2 | Status: SHIPPED | OUTPATIENT
Start: 2021-09-23 | End: 2021-10-21 | Stop reason: SDUPTHER

## 2021-09-23 NOTE — PROGRESS NOTES
"Chief Complaint  Tremors (Here to review Datscan )    Subjective          Lopez Nunez is a 81 y.o. male who presents to [facility]  History of Present Illness  81-year-old man here for follow-up of his left arm tremor.  He states that he is taking pramipexole 0.5 mg, 3 tablets at 7 AM, 2 tablets at noon and 2 tablets at 4 PM.  He lower the dose since he was taking 3 tablets 3 times a day and he did not think that that dose made any difference.  He continues to have tremor of his left hand.  He wants it to be more controlled.  He does not have any problems with activities of daily living.  He had a DaTscan.  It showed positive for decreased dopamine uptake in the striatal neurons.    Objective   Vital Signs:   /53 (BP Location: Right arm, Patient Position: Sitting, Cuff Size: Adult)   Pulse (!) 49   Ht 180.3 cm (70.98\")   Wt 76.7 kg (169 lb)   BMI 23.58 kg/m²     Physical Exam   He is alert, fluent, phasic, follows commands well.  There is no bradykinesia of his upper or lower extremities.  There is an man tremor with his left hand at rest.  There is no rigidity.  On station gait is able to ambulate with normal arm swing.  There is a tremor noted intermittent of his left hand.  He is able to tiptoe, heel walk, joel and tandem.  Heart is regular rhythm normal rate        Assessment and Plan  Diagnoses and all orders for this visit:    1. Parkinson's disease (CMS/[facility]) (Primary)  Assessment & Plan:  I will start him on carbidopa/levodopa.  He is to take 25/100 1/2 tablet 3 times a day for 2 days and then increase it to 1 tablet 3 times a day thereafter.  I gave him a list.  He is to increase it by 1 tablet every week thereafter until his tremors improve while he is taking up to 2 tablets 3 times a day.  He is to take it along with his Mirapex.  I discussed with him the adverse effects of carbidopa/levodopa including nausea, lightheadedness, dizziness, confusion " at higher doses.  I will see him again in 6 weeks time for follow-up.      Other orders  -     pramipexole (Mirapex) 0.5 MG tablet; Titrate weekly and take up to 3 tablets 3 times a day.  Dispense: 270 tablet; Refill: 2  -     carbidopa-levodopa (SINEMET)  MG per tablet; Take up to 2 tablets 3 times a day  Dispense: 180 tablet; Refill: 6       Total time spent with the patient and coordinating patient care was 25 minutes.    Follow Up  No follow-ups on file.  Patient was given instructions and counseling regarding his condition or for health maintenance advice. Please see specific information pulled into the AVS if appropriate.

## 2021-09-23 NOTE — ASSESSMENT & PLAN NOTE
I will start him on carbidopa/levodopa.  He is to take 25/100 1/2 tablet 3 times a day for 2 days and then increase it to 1 tablet 3 times a day thereafter.  I gave him a list.  He is to increase it by 1 tablet every week thereafter until his tremors improve while he is taking up to 2 tablets 3 times a day.  He is to take it along with his Mirapex.  I discussed with him the adverse effects of carbidopa/levodopa including nausea, lightheadedness, dizziness, confusion at higher doses.  I will see him again in 6 weeks time for follow-up.

## 2021-09-29 ENCOUNTER — OFFICE VISIT (OUTPATIENT)
Dept: OTOLARYNGOLOGY | Facility: CLINIC | Age: 81
End: 2021-09-29

## 2021-09-29 VITALS — BODY MASS INDEX: 24.88 KG/M2 | TEMPERATURE: 97.5 F | HEIGHT: 70 IN | WEIGHT: 173.8 LBS

## 2021-09-29 DIAGNOSIS — H92.12 OTORRHEA OF LEFT EAR: Primary | ICD-10-CM

## 2021-09-29 DIAGNOSIS — H72.92 PERFORATION OF LEFT TYMPANIC MEMBRANE: ICD-10-CM

## 2021-09-29 PROCEDURE — 99213 OFFICE O/P EST LOW 20 MIN: CPT | Performed by: OTOLARYNGOLOGY

## 2021-09-29 RX ORDER — CIPROFLOXACIN AND DEXAMETHASONE 3; 1 MG/ML; MG/ML
4 SUSPENSION/ DROPS AURICULAR (OTIC) 2 TIMES DAILY
Qty: 7.5 ML | Refills: 1 | Status: SHIPPED | OUTPATIENT
Start: 2021-09-29 | End: 2021-10-06

## 2021-09-29 NOTE — PROGRESS NOTES
Patient Name: Lopez Nunez   Visit Date: 09/29/2021   Patient ID: [id number]  Provider: Isaias Le MD    Sex: male  [place] [facility]   YOB: 1940  Location Address: [address] [place] [address], [address] [place] [address],?[place]?[id number]    Primary Care Provider Joshua Ha MD  Location Phone: [phone]    Referring Provider: No ref. provider found        Chief Complaint  Ear Drainage    History of Present Illness  Lopez Nunez is a 81 y.o. male who returns today for follow-up.  He has previously been seen for hearing loss and left-sided tympanic membrane perforations. He was initially seen on 11/10/2016 at which time he reported gradual hearing loss over a number of years. He was having quite a bit of difficulty with this and was interested in options. 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 He has not had any issues with otorrhea. He underwent an audiogram on 11/15/16 which revealed right moderate rising to mild sloping to severe mixed hearing loss and left severe rising to moderate sloping to profound mixed hearing loss. The air-bone gap on the left is 40 dB at 500 Hz, 15 dB at 1000 Hz, 20 dB at 2000 Hz, and 30 dB at 4000 Hz. His bone line is mild sloping to moderate. Speech discrimination is 96% on the right and 80% on the left. Tympanogram was type A on the right type B on the left. Audiogram on 1/23/18 revealed right moderate rising to mild for sloping to severe sensorineural hearing loss and left severe to profound mixed hearing loss. Speech discrimination was 92% on the right at 70 dB and 100% on the left at 85 dB. Tympanograms are type A on the right and B on the left. He was seen on 8/5/2019 for evaluation of dysphasia.  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   Esophagram on 7/3/2019 revealed a mildly prominent cricopharyngeus and a small sliding hiatal hernia as well as tertiary esophageal contractions consistent with presbyesophagus/dysmotility.  He was most recently seen on 11/11/2019  at which time he was doing well on saline irrigations, Flonase, and Zyrtec.  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    He returns today for follow-up.  He tells me that he has had trouble with left sided foul smelling otorrhea x 6 days. No pain but reduced hearing.  He has not had any fevers or chills.  He has not had trouble like this before.  He continues to wear his hearing aids but has had to stop using the one on the left since that started.      Past Medical History:   Diagnosis Date   • Chronic rhinitis    • Dysphagia    • Elevated PSA    • Hypertension    • Mixed conductive and sensorineural hearing loss of left ear    • Myasthenia gravis (CMS/[facility])    • Parkinson's disease (CMS/[facility])    • Prostate cancer (CMS/[facility]) 06/10/2020   • Tremor    • Tympanic membrane perforation, left        Past Surgical History:   Procedure Laterality Date   • COLONOSCOPY  2009   • CYST REMOVAL     • ENDOSCOPY  2017   • PROSTATE BIOPSY  1/17/2011 07/25/2011    per Dr. Barker   • PROSTATE SURGERY     • TONSILLECTOMY           Current Outpatient Medications:   •  amLODIPine (NORVASC) 10 MG tablet, TAKE ONE TABLET BY MOUTH DAILY, Disp: 90 tablet, Rfl: 1  •  aspirin (aspirin) 81 MG EC tablet, Take 81 mg by mouth Daily. Every other day, Disp: , Rfl:   •  carbidopa-levodopa (SINEMET)  MG per tablet, Take up to 2 tablets 3 times a day (Patient taking differently: Take 1 tablet by mouth 3 (Three) Times a Day. Take up to 2 tablets 3 times a day), Disp: 180 tablet, Rfl: 6  •  cetirizine (zyrTEC) 10 MG tablet, cetirizine 10 mg oral tablet take 1 tablet (10 mg) by oral route once daily   Active, Disp: , Rfl:   •  Cholecalciferol 50 MCG  "( UT) capsule, Vitamin D3 2,000 unit oral capsule take 1 capsule by oral route daily   Active, Disp: , Rfl:   •  lisinopril (PRINIVIL,ZESTRIL) 40 MG tablet, TAKE ONE TABLET BY MOUTH DAILY, Disp: 90 tablet, Rfl: 1  •  multivitamin with minerals (CENTRUM SILVER 50+MEN PO), Centrum Silver 0.4-300-250 mg-mcg-mcg oral tablet take 1 tablet by oral route daily   Active, Disp: , Rfl:   •  omeprazole (priLOSEC) 20 MG capsule, omeprazole 20 mg oral capsule,delayed release(DR/EC) take 1 capsule (20 mg) by oral route once daily before a meal   Suspended, Disp: , Rfl:   •  pramipexole (Mirapex) 0.5 MG tablet, Titrate weekly and take up to 3 tablets 3 times a day., Disp: 270 tablet, Rfl: 2  •  ciprofloxacin-dexamethasone (CIPRODEX) 0.3-0.1 % otic suspension, Administer 4 drops into the left ear 2 (Two) Times a Day for 7 days., Disp: 7.5 mL, Rfl: 1  •  fluticasone (Flonase) 50 MCG/ACT nasal spray, PLACE TWO SPRAYS IN EACH NOSTRIL DAILY, Disp: , Rfl:      No Known Allergies    Social History     Tobacco Use   • Smoking status: Former Smoker     Types: Cigarettes     Quit date:      Years since quittin.7   • Smokeless tobacco: Never Used   Vaping Use   • Vaping Use: Never used   Substance Use Topics   • Alcohol use: Yes     Comment: rarely drinks less than 1 drink per day, has been drinking for 31 or more years/ 1 bourbon weekly   • Drug use: Never        Objective     Vital Signs:   Temp 97.5 °F (36.4 °C) (Temporal)   Ht 177.8 cm (70\")   Wt 78.8 kg (173 lb 12.8 oz)   BMI 24.94 kg/m²       Physical Exam    General: Well developed, well nourished patient of stated age in no acute distress. Voice is strong and clear.   Head: Normocephalic and atraumatic.  Face: No lesions.  Bilateral parotid and submandibular glands are unremarkable.  Stensen's and Warthin's ducts are productive of clear saliva bilaterally.  House-Brackmann I/VI bilaterally.   muscles and temporomandibular joint nontender to palpation.  No " TMJ crepitus.  Eyes: PERRLA, sclerae anicteric, no conjunctival injection. Extra ocular movements are intact and full. No nystagmus.   Ears: Auricles are normal in appearance. Bilateral external auditory canals are unremarkable.  Right tympanic membrane is normal in appearance.  Left tympanic membrane with approximately 20 to 30% posterior inferior perforation draining purulence.  Ciprodex drops were placed. hearing normal to conversational voice.   Nose: External nose is normal in appearance. Bilateral nares are patent with normal appearing mucosa. Septum midline. Turbinates are unremarkable. No lesions.   Oral Cavity: Lips are normal in appearance. Oral mucosa is unremarkable. Gingiva is unremarkable.  Partial dentition for age. Tongue is unremarkable with good movement. Hard palate is unremarkable.   Oropharynx: Soft palate is unremarkable with full movement. Uvula is unremarkable. Bilateral tonsils are unremarkable. Posterior oropharynx is unremarkable.    Larynx and hypopharynx: Deferred secondary to gag reflex.  Neck: Supple.  No mass.  Nontender to palpation.  Trachea midline. Thyroid normal size and without nodules to palpation.   Lymphatic: No lymphadenopathy upon palpation.  Respiratory: Clear to auscultation bilaterally, nonlabored respirations    Cardiovascular: RRR, no murmurs, rubs, or gallops,   Psychiatric: Appropriate affect, cooperative   Neurologic: Oriented x 3, strength symmetric in all extremities, Cranial Nerves II-XII are grossly intact to confrontation   Skin: Warm and dry. No rashes.    Procedures           Result Review :{Labs  Result Review  Imaging  Med Tab  Media  Procedures :23}               Assessment and Plan    Diagnoses and all orders for this visit:    1. Otorrhea of left ear (Primary)  -     ciprofloxacin-dexamethasone (CIPRODEX) 0.3-0.1 % otic suspension; Administer 4 drops into the left ear 2 (Two) Times a Day for 7 days.  Dispense: 7.5 mL; Refill: 1    2. Perforation of  left tympanic membrane  -     ciprofloxacin-dexamethasone (CIPRODEX) 0.3-0.1 % otic suspension; Administer 4 drops into the left ear 2 (Two) Times a Day for 7 days.  Dispense: 7.5 mL; Refill: 1    Impressions and findings were discussed.  Currently, he is draining purulence from his left tympanic membrane perforation.  We discussed common reasons for otorrhea/infection and he was advised to try to take out the left-sided hearing aid if his ears are sweaty or moist to let the canal dry.  In regards to his current infection he will be placed on a course of Ciprodex and will call if he is not doing any better in 7 days arrange follow-up.        Follow Up   No follow-ups on file.  Patient was given instructions and counseling regarding his condition or for health maintenance advice. Please see specific information pulled into the AVS if appropriate.

## 2021-10-08 ENCOUNTER — TELEPHONE (OUTPATIENT)
Dept: INTERNAL MEDICINE | Facility: CLINIC | Age: 81
End: 2021-10-08

## 2021-10-08 DIAGNOSIS — M54.9 CHRONIC BACK PAIN, UNSPECIFIED BACK LOCATION, UNSPECIFIED BACK PAIN LATERALITY: Primary | ICD-10-CM

## 2021-10-08 DIAGNOSIS — G89.29 CHRONIC BACK PAIN, UNSPECIFIED BACK LOCATION, UNSPECIFIED BACK PAIN LATERALITY: Primary | ICD-10-CM

## 2021-10-08 NOTE — TELEPHONE ENCOUNTER
Pt states that he is having back pain and would like to go to [facility], if this is okay, can you send this back to me, I told him I would have it there Monday morning.

## 2021-10-21 ENCOUNTER — LAB (OUTPATIENT)
Dept: LAB | Facility: HOSPITAL | Age: 81
End: 2021-10-21

## 2021-10-21 ENCOUNTER — OFFICE VISIT (OUTPATIENT)
Dept: NEUROLOGY | Facility: CLINIC | Age: 81
End: 2021-10-21

## 2021-10-21 VITALS
BODY MASS INDEX: 24.77 KG/M2 | SYSTOLIC BLOOD PRESSURE: 137 MMHG | DIASTOLIC BLOOD PRESSURE: 45 MMHG | HEIGHT: 70 IN | WEIGHT: 173 LBS | HEART RATE: 50 BPM

## 2021-10-21 DIAGNOSIS — C61 PROSTATE CANCER (HCC): ICD-10-CM

## 2021-10-21 DIAGNOSIS — G20 PARKINSON'S DISEASE (HCC): Primary | ICD-10-CM

## 2021-10-21 LAB — PSA SERPL-MCNC: <0.014 NG/ML (ref 0–4)

## 2021-10-21 PROCEDURE — 36415 COLL VENOUS BLD VENIPUNCTURE: CPT

## 2021-10-21 PROCEDURE — 84153 ASSAY OF PSA TOTAL: CPT

## 2021-10-21 PROCEDURE — 99213 OFFICE O/P EST LOW 20 MIN: CPT | Performed by: PSYCHIATRY & NEUROLOGY

## 2021-10-21 RX ORDER — PRAMIPEXOLE DIHYDROCHLORIDE 0.5 MG/1
TABLET ORAL
Qty: 210 TABLET | Refills: 5 | Status: SHIPPED | OUTPATIENT
Start: 2021-10-21 | End: 2022-02-21 | Stop reason: SDUPTHER

## 2021-10-21 NOTE — PROGRESS NOTES
"Chief Complaint  Parkinson's Disease    Subjective          Lopez Nunez is a 81 y.o. male who presents to [facility]  History of Present Illness  81-year-old man here for follow-up of his Parkinson's disease.  His tremor has significantly improved on combination of pramipexole and carbidopa/levodopa.  He is taking carbidopa/levodopa 25/100 2 tablets 3 times a day at 7:00 in the morning, 12 noon, 4 PM and pramipexole 0.5 mg 3 tablets in the morning, 2 tablets at noon and 4 PM.  He states that he is able to move his left arm and the shaking has gone away.  He is able to do movement such as boxing with his left arm worse in the past he could not do it.  He has no adverse effects of the medication.    Objective   Vital Signs:   /45   Pulse 50   Ht 177.8 cm (70\")   Wt 78.5 kg (173 lb)   BMI 24.82 kg/m²     Physical Exam   He is alert, fluent, phasic, follows commands well.  There is no tremor noted to the left hand at rest.  He has no difficulty with station and gait.  Heart is regular rhythm normal rate.        Assessment and Plan  Diagnoses and all orders for this visit:    1. Parkinson's disease (HCC) (Primary)  Assessment & Plan:  He is to continue taking present medications of carbidopa/levodopa 25/100 2 tablets 3 times a day and pramipexole 0.5 mg 3 in the morning, 2 at noon and evening.  I will see him again in 4 months time for follow-up.      Other orders  -     carbidopa-levodopa (SINEMET)  MG per tablet; Take 2 tablets 3 times a day.  Dispense: 180 tablet; Refill: 6  -     pramipexole (Mirapex) 0.5 MG tablet; Take 3 tablets in the morning, 2 tablets at noon and 2 tablets in the evening.  Dispense: 210 tablet; Refill: 5       Total time spent with the patient and coordinating patient care was 15 minutes.    Follow Up  No follow-ups on file.  Patient was given instructions and counseling regarding his condition or for health maintenance advice. " Please see specific information pulled into the AVS if appropriate.

## 2021-10-21 NOTE — ASSESSMENT & PLAN NOTE
He is to continue taking present medications of carbidopa/levodopa 25/100 2 tablets 3 times a day and pramipexole 0.5 mg 3 in the morning, 2 at noon and evening.  I will see him again in 4 months time for follow-up.

## 2021-11-03 ENCOUNTER — OFFICE VISIT (OUTPATIENT)
Dept: UROLOGY | Facility: CLINIC | Age: 81
End: 2021-11-03

## 2021-11-03 DIAGNOSIS — C61 PROSTATE CANCER (HCC): Primary | ICD-10-CM

## 2021-11-03 PROCEDURE — 99441 PR PHYS/QHP TELEPHONE EVALUATION 5-10 MIN: CPT | Performed by: UROLOGY

## 2021-11-03 NOTE — PROGRESS NOTES
Chief Complaint  No chief complaint on file.    Subjective          Lopez Nunez presents to [facility]  The patient presents for follow-up of prostate cancer. The initial diagnosis was in July, 2020 and his initial treatment was laparoscopic robotic prostatectomy.     The PSA at diagnosis was known and it was 8.4. The biopsy Medhat's score was 3+4 and 4+3. The final Medhat's score was 3+4 and 4+3. Pathologic stage on final diagnosis was known. It was T3a N0 M0. Surgical margins were negative. Perineural invasion was not reported. He has required no subsequent treatment for the cancer.     His PSA favio is known. His psa favio was 0.01. His highest PSA value is known. The highest PSA value was 0.01. His PSA trend is undetectable. Prior imaging studies have been done. These include a bone scan and a CT scan and no metastases were detected.     Currently, he complains of no other bothersome symptoms.       He has no new complaints.  His urinary control is much better over the last month or so.  He states it is much improved.  He is almost 80-90% dry.  He does wear a liner but still has some episodes of leakage.  He is pleased with where he is at at this point.      PSA  ----------------------  01/21   0.01  04/21   0.01  05/21   0.01  07/21   0.014  10/21   0.014    Lopez Nunez is a 80 year old /White male who is presenting for evaluation via phone. Verbal consent obtained before beginning visit. Phone visit was 5 minutes.     The following staff were present during this visit: Theresa Saha MA         Result Review :                 Assessment and Plan    Diagnoses and all orders for this visit:    1. Prostate cancer (HCC) (Primary)  Assessment & Plan:  He will need a repeat PSA again in 3 months.     Orders:  -     PSA DIAGNOSTIC; Future      Follow Up   Return in about 3 months (around 2/3/2022) for PSA prior to visit.  Patient was given instructions and counseling  regarding his condition or for health maintenance advice. Please see specific information pulled into the AVS if appropriate.

## 2021-11-04 ENCOUNTER — OFFICE VISIT (OUTPATIENT)
Dept: GASTROENTEROLOGY | Facility: CLINIC | Age: 81
End: 2021-11-04

## 2021-11-04 VITALS
WEIGHT: 178.8 LBS | HEART RATE: 53 BPM | HEIGHT: 70 IN | SYSTOLIC BLOOD PRESSURE: 145 MMHG | DIASTOLIC BLOOD PRESSURE: 52 MMHG | BODY MASS INDEX: 25.6 KG/M2

## 2021-11-04 DIAGNOSIS — K21.9 GASTROESOPHAGEAL REFLUX DISEASE, UNSPECIFIED WHETHER ESOPHAGITIS PRESENT: Primary | ICD-10-CM

## 2021-11-04 DIAGNOSIS — Z12.11 ENCOUNTER FOR SCREENING FOR MALIGNANT NEOPLASM OF COLON: ICD-10-CM

## 2021-11-04 PROCEDURE — 99213 OFFICE O/P EST LOW 20 MIN: CPT | Performed by: NURSE PRACTITIONER

## 2021-11-04 RX ORDER — OMEPRAZOLE 20 MG/1
20 CAPSULE, DELAYED RELEASE ORAL DAILY
Qty: 90 CAPSULE | Refills: 3 | Status: SHIPPED | OUTPATIENT
Start: 2021-11-04

## 2021-11-04 NOTE — PROGRESS NOTES
Chief Complaint  Follow-up (GERD)    Lopez Nunez is a 81 y.o. male who presents to [facility] for follow-up of GERD and dysphagia.  History of present Illness  He reports that he's doing well.  Dysphagia and GERD are controlled with omeprazole daily.      He reports that it's time for colon cancer screening.  He was previously scheduled for colonoscopy and had to cancel due to radical prostatectomy.  History of Present Illness    Past Medical History:   Diagnosis Date   • Chronic rhinitis    • Dysphagia    • Elevated PSA    • GERD (gastroesophageal reflux disease)    • Hypertension    • Mixed conductive and sensorineural hearing loss of left ear    • Myasthenia gravis (HCC)    • Parkinson's disease (HCC)    • Prostate cancer (HCC) 06/10/2020   • Tremor    • Tympanic membrane perforation, left        Past Surgical History:   Procedure Laterality Date   • COLONOSCOPY  2009   • CYST REMOVAL     • ENDOSCOPY  2017   • PROSTATE BIOPSY  1/17/2011 07/25/2011    per Dr. Barker   • PROSTATE SURGERY     • TONSILLECTOMY     • UPPER GASTROINTESTINAL ENDOSCOPY           Current Outpatient Medications:   •  amLODIPine (NORVASC) 10 MG tablet, TAKE ONE TABLET BY MOUTH DAILY, Disp: 90 tablet, Rfl: 1  •  aspirin (aspirin) 81 MG EC tablet, Take 81 mg by mouth Daily. Every other day, Disp: , Rfl:   •  carbidopa-levodopa (SINEMET)  MG per tablet, Take 2 tablets 3 times a day., Disp: 180 tablet, Rfl: 6  •  cetirizine (zyrTEC) 10 MG tablet, cetirizine 10 mg oral tablet take 1 tablet (10 mg) by oral route once daily   Active, Disp: , Rfl:   •  Cholecalciferol 50 MCG (2000 UT) capsule, Vitamin D3 2,000 unit oral capsule take 1 capsule by oral route daily   Active, Disp: , Rfl:   •  lisinopril (PRINIVIL,ZESTRIL) 40 MG tablet, TAKE ONE TABLET BY MOUTH DAILY, Disp: 90 tablet, Rfl: 1  •  multivitamin with minerals (CENTRUM SILVER 50+MEN PO), Centrum Silver 0.4-300-250 mg-mcg-mcg oral tablet take 1  "tablet by oral route daily   Active, Disp: , Rfl:   •  omeprazole (priLOSEC) 20 MG capsule, Take 1 capsule by mouth Daily., Disp: 90 capsule, Rfl: 3  •  pramipexole (Mirapex) 0.5 MG tablet, Take 3 tablets in the morning, 2 tablets at noon and 2 tablets in the evening., Disp: 210 tablet, Rfl: 5     No Known Allergies    Family History   Problem Relation Age of Onset   • Cancer Mother    • Heart disease Mother    • Diabetes Mother    • Cancer Sister    • Diabetes Brother    • Cancer Daughter    • Cancer Son         Social History     Social History Narrative   • Not on file       Objective     Review of Systems   Constitutional: Negative for fever and unexpected weight loss.   Respiratory: Negative for cough and shortness of breath.    Cardiovascular: Negative for chest pain and palpitations.   Gastrointestinal:        See HPI   Neurological: Negative for weakness and confusion.        Vital Signs:   /52 (BP Location: Left arm, Patient Position: Sitting, Cuff Size: Adult)   Pulse 53   Ht 177.8 cm (70\")   Wt 81.1 kg (178 lb 12.8 oz)   BMI 25.66 kg/m²       Physical Exam  Constitutional:       General: He is not in acute distress.     Appearance: Normal appearance. He is well-developed and normal weight.   HENT:      Head: Normocephalic and atraumatic.   Eyes:      Conjunctiva/sclera: Conjunctivae normal.      Pupils: Pupils are equal, round, and reactive to light.      Visual Fields: Right eye visual fields normal and left eye visual fields normal.   Cardiovascular:      Rate and Rhythm: Normal rate and regular rhythm.      Heart sounds: Normal heart sounds.   Pulmonary:      Effort: Pulmonary effort is normal. No retractions.      Breath sounds: Normal breath sounds and air entry.   Abdominal:      General: Bowel sounds are normal.      Palpations: Abdomen is soft.      Tenderness: There is no abdominal tenderness.      Comments: No appreciable hepatosplenomegaly or ascites   Musculoskeletal:         " General: Normal range of motion.      Cervical back: Neck supple.      Right lower leg: No edema.      Left lower leg: No edema.   Lymphadenopathy:      Cervical: No cervical adenopathy.   Skin:     General: Skin is warm and dry.      Findings: No lesion.   Neurological:      General: No focal deficit present.      Mental Status: He is alert and oriented to person, place, and time.   Psychiatric:         Mood and Affect: Mood and affect normal.         Behavior: Behavior normal.         Result Review :   The following data was reviewed by: ALIN Emery on 11/04/2021:  CBC w/diff    CBC w/Diff 5/3/21   WBC 7.42   RBC 5.53   Hemoglobin 14.5   Hematocrit 46.3   MCV 83.7   MCH 26.2 (A)   MCHC 31.3 (A)   RDW 15.1 (A)   Platelets 277   Neutrophil Rel % 66.2   Lymphocyte Rel % 22.4   Monocyte Rel % 7.7   Eosinophil Rel % 2.3   Basophil Rel % 1.1   (A) Abnormal value            CMP    CMP 5/3/21   Glucose 91   BUN 23   Creatinine 1.17   Sodium 148 (A)   Potassium 4.5   Chloride 108   Calcium 9.4   Albumin 4.2   Total Bilirubin 0.49   Alkaline Phosphatase 86   AST (SGOT) 18   ALT (SGPT) 15   (A) Abnormal value                        Assessment and Plan    Diagnoses and all orders for this visit:    1. Gastroesophageal reflux disease, unspecified whether esophagitis present (Primary)  -     omeprazole (priLOSEC) 20 MG capsule; Take 1 capsule by mouth Daily.  Dispense: 90 capsule; Refill: 3    2. Encounter for screening for malignant neoplasm of colon  -     Cologuard - Stool, Per Rectum      Continue omeprazole for control of GERD and dysphagia as this is effective.        Follow Up   Return in about 1 year (around 11/4/2022) for GERD.  Patient was given instructions and counseling regarding his condition or for health maintenance advice. Please see specific information pulled into the AVS if appropriate.

## 2021-12-02 ENCOUNTER — TELEPHONE (OUTPATIENT)
Dept: GASTROENTEROLOGY | Facility: CLINIC | Age: 81
End: 2021-12-02

## 2021-12-02 NOTE — TELEPHONE ENCOUNTER
----- Message from ALIN Emery sent at 12/1/2021 10:32 PM EST -----  Please let patient know that Cologuard is negative.  No further colon cancer screening indicated due to patient's age.

## 2022-01-11 NOTE — TELEPHONE ENCOUNTER
Spoke to Pierce. Patient would like to get a Rx for ED that can be taken orally.     He has been on the injection from [facility] and would like to get away from this clinic. He states the injections are working, but he would like to do something other than injections.  If you are agreeable, please send something to Maddy @ [facility].     He does have appointment scheduled for 2/14/22.

## 2022-01-11 NOTE — TELEPHONE ENCOUNTER
Pt wants to talk to you about a medication that he wants to have filled.  Pt was not able to to tell me the name of the medication.

## 2022-01-11 NOTE — TELEPHONE ENCOUNTER
We can discuss at his appointment in Feb.  There are a few options but also we could do the injection at a much lower cost and that is the most likely to work for him.

## 2022-01-11 NOTE — TELEPHONE ENCOUNTER
Informed pt of discussing injections/meds at 2/14 appt. Patient stated that he was fine with this.

## 2022-01-24 RX ORDER — LISINOPRIL 40 MG/1
TABLET ORAL
Qty: 90 TABLET | Refills: 1 | Status: SHIPPED | OUTPATIENT
Start: 2022-01-24 | End: 2022-05-05 | Stop reason: SDUPTHER

## 2022-01-24 RX ORDER — AMLODIPINE BESYLATE 10 MG/1
TABLET ORAL
Qty: 90 TABLET | Refills: 1 | Status: SHIPPED | OUTPATIENT
Start: 2022-01-24 | End: 2022-05-05 | Stop reason: SDUPTHER

## 2022-02-09 ENCOUNTER — LAB (OUTPATIENT)
Dept: LAB | Facility: HOSPITAL | Age: 82
End: 2022-02-09

## 2022-02-09 DIAGNOSIS — C61 PROSTATE CANCER: ICD-10-CM

## 2022-02-09 LAB — PSA SERPL-MCNC: <0.014 NG/ML (ref 0–4)

## 2022-02-09 PROCEDURE — 84153 ASSAY OF PSA TOTAL: CPT

## 2022-02-09 PROCEDURE — 36415 COLL VENOUS BLD VENIPUNCTURE: CPT

## 2022-02-14 ENCOUNTER — OFFICE VISIT (OUTPATIENT)
Dept: UROLOGY | Facility: CLINIC | Age: 82
End: 2022-02-14

## 2022-02-14 VITALS
DIASTOLIC BLOOD PRESSURE: 50 MMHG | HEIGHT: 70 IN | SYSTOLIC BLOOD PRESSURE: 168 MMHG | WEIGHT: 178.2 LBS | BODY MASS INDEX: 25.51 KG/M2

## 2022-02-14 DIAGNOSIS — C61 PROSTATE CANCER: Primary | ICD-10-CM

## 2022-02-14 LAB
BILIRUB BLD-MCNC: ABNORMAL MG/DL
CLARITY, POC: CLEAR
COLOR UR: ABNORMAL
EXPIRATION DATE: ABNORMAL
GLUCOSE UR STRIP-MCNC: NEGATIVE MG/DL
KETONES UR QL: ABNORMAL
LEUKOCYTE EST, POC: NEGATIVE
Lab: ABNORMAL
NITRITE UR-MCNC: NEGATIVE MG/ML
PH UR: 5.5 [PH] (ref 5–8)
PROT UR STRIP-MCNC: ABNORMAL MG/DL
RBC # UR STRIP: NEGATIVE /UL
SP GR UR: 1.03 (ref 1–1.03)
UROBILINOGEN UR QL: NORMAL

## 2022-02-14 PROCEDURE — 99214 OFFICE O/P EST MOD 30 MIN: CPT | Performed by: UROLOGY

## 2022-02-14 PROCEDURE — 81003 URINALYSIS AUTO W/O SCOPE: CPT | Performed by: UROLOGY

## 2022-02-14 NOTE — PROGRESS NOTES
"Chief Complaint  Prostate Cancer    Subjective          Lopez Nunez presents to [facility] UROLOG[facility]  History of Present Illness    Lopez Pelayo is an 81-year-old gentleman with a past history of prostate cancer, initially diagnosed in 07/2020, at which time he had a laparoscopic robotic prostatectomy. His PSA at  diagnosis was 8. Final Medhat score was 4+3. [id number] with no negative surgical margins. His PSA has remained undetectable. He did have a CT scan and bone scan prior to surgery, which showed no evidence of metastasis. His most recent PSA from 02/09/2022 is less than 0.014.    He states that he feels good and does anything he wants to do. He has been going to the gym and exercising. He reports he does experience urinary leakage, but he has gone 5 days without experiencing this.     He has erectile dysfunction.    A review of systems was completed and positive findings are noted in the HPI.    Objective   Vital Signs:   /50 (BP Location: Right arm, Patient Position: Sitting, Cuff Size: Adult)   Ht 177.8 cm (70\")   Wt 80.8 kg (178 lb 3.2 oz)   BMI 25.57 kg/m²     Physical Exam  Vitals and nursing note reviewed.   Constitutional:       Appearance: Normal appearance. He is well-developed.   Pulmonary:      Effort: Pulmonary effort is normal.      Breath sounds: Normal air entry.   Neurological:      Mental Status: He is alert and oriented to person, place, and time.      Motor: Motor function is intact.   Psychiatric:         Mood and Affect: Mood normal.         Behavior: Behavior normal.        Result Review :   The following data was reviewed by: Dede Barker MD on 02/14/2022:  PSA    PSA 7/22/21 10/21/21 2/9/22   PSA 0.014 <0.014 <0.014                      Results for orders placed or performed in visit on 02/14/22   POC Urinalysis Dipstick, Automated    Specimen: Urine   Result Value Ref Range    Color Dark Yellow Yellow, Straw, Dark Yellow, Barbara    Clarity, UA Clear " Clear    Specific Gravity  1.030 1.005 - 1.030    pH, Urine 5.5 5.0 - 8.0    Leukocytes Negative Negative    Nitrite, UA Negative Negative    Protein, POC 30 mg/dL (A) Negative mg/dL    Glucose, UA Negative Negative, 1000 mg/dL (3+) mg/dL    Ketones, UA 15 mg/dL (A) Negative    Urobilinogen, UA Normal Normal    Bilirubin Small (1+) (A) Negative    Blood, UA Negative Negative    Lot Number [id number]     Expiration Date 02/01/2023         Assessment and Plan    Diagnoses and all orders for this visit:    1. Prostate cancer (HCC) (Primary)  -     POC Urinalysis Dipstick, Automated  -     PSA DIAGNOSTIC; Future  -    We will recheck his PSA in 3 months and likely can go to every 6 months after that.      2.  Erectile Dysfunction:    -He is doing well. Trimix prescription sent. He will continue to work on his Kegel exercises as needed.        Follow Up   Return in about 3 months (around 5/14/2022) for PSA prior to visit.  Patient was given instructions and counseling regarding his condition or for health maintenance advice. Please see specific information pulled into the AVS if appropriate.     Transcribed from ambient dictation for Dede Barker MD by BERYL ISRAEL.  02/14/22   15:34 EST    Patient verbalized consent to the visit recording.  I have personally performed the services described in this document as transcribed by the above individual, and it is both accurate and complete.  Dede Barker MD  2/14/2022  16:14 EST

## 2022-02-21 ENCOUNTER — OFFICE VISIT (OUTPATIENT)
Dept: NEUROLOGY | Facility: CLINIC | Age: 82
End: 2022-02-21

## 2022-02-21 ENCOUNTER — TELEPHONE (OUTPATIENT)
Dept: UROLOGY | Facility: CLINIC | Age: 82
End: 2022-02-21

## 2022-02-21 VITALS
HEART RATE: 51 BPM | SYSTOLIC BLOOD PRESSURE: 122 MMHG | BODY MASS INDEX: 25.48 KG/M2 | HEIGHT: 70 IN | DIASTOLIC BLOOD PRESSURE: 51 MMHG | WEIGHT: 178 LBS

## 2022-02-21 DIAGNOSIS — G70.00 OCULAR MYASTHENIA GRAVIS: ICD-10-CM

## 2022-02-21 DIAGNOSIS — G20 PARKINSON'S DISEASE: Primary | ICD-10-CM

## 2022-02-21 PROCEDURE — 99213 OFFICE O/P EST LOW 20 MIN: CPT | Performed by: PSYCHIATRY & NEUROLOGY

## 2022-02-21 RX ORDER — MYCOPHENOLATE MOFETIL 250 MG/1
250 CAPSULE ORAL NIGHTLY
Qty: 30 CAPSULE | Refills: 8 | Status: SHIPPED | OUTPATIENT
Start: 2022-02-21 | End: 2022-10-21 | Stop reason: SDUPTHER

## 2022-02-21 RX ORDER — MYCOPHENOLATE MOFETIL 250 MG/1
250 CAPSULE ORAL NIGHTLY
COMMUNITY
End: 2022-02-21 | Stop reason: SDUPTHER

## 2022-02-21 RX ORDER — PRAMIPEXOLE DIHYDROCHLORIDE 0.5 MG/1
TABLET ORAL
Qty: 210 TABLET | Refills: 5 | Status: SHIPPED | OUTPATIENT
Start: 2022-02-21 | End: 2022-07-15 | Stop reason: SDUPTHER

## 2022-02-21 NOTE — ASSESSMENT & PLAN NOTE
He is to continue taking pramipexole and carbidopa/levodopa.  I will see him again in 8 months time for follow-up.

## 2022-02-21 NOTE — PROGRESS NOTES
"Chief Complaint  Follow-up (NEEDING REFILLS ON PRAMIPEXOLE, CARBIDOPA & MYCOPHENOLATE)    Subjective          Lopez Nunez is a 81 y.o. male who presents to [facility]  History of Present Illness  81-year-old man here for follow-up of his Parkinson's disease as well as ocular myasthenia.  He wants a refill for his CellCept, dopa levodopa and pramipexole.  He is here with his wife today.  He is independent with activities of daily living.  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  Is able to do all activities of daily living without any problems.  He does not have any double vision or ptosis.  Is very active and doing activities including exercising.    Objective   Vital Signs:   /51 (BP Location: Right arm, Patient Position: Sitting, Cuff Size: Adult)   Pulse 51   Ht 177.8 cm (70\")   Wt 80.7 kg (178 lb)   BMI 25.54 kg/m²     Physical Exam   He is alert, fluent, phasic, follows commands well.  Visual fields full confrontation, EOMs full directions gaze, facial strength is full, soft palate elevation and tongue are normal.  There is no weakness of the upper or lower extremities with muscle testing.  There is no rigidity or tremor.  Station gait is able to tiptoe, heel walk, joel.  He has slightly difficult time tandem.  Heart is regular rhythm normal in rate        Assessment and Plan  Diagnoses and all orders for this visit:    1. Parkinson's disease (HCC) (Primary)  Assessment & Plan:  He is to continue taking pramipexole and carbidopa/levodopa.  I will see him again in 8 months time for follow-up.      2. Ocular myasthenia gravis (HCC)  Assessment & Plan:  He is to continue taking CellCept.      Other orders  -     carbidopa-levodopa (SINEMET)  MG per tablet; Take 2 tablets 3 times a day.  Dispense: 180 tablet; Refill: 6  -     mycophenolate (CELLCEPT) 250 MG capsule; Take 1 capsule by mouth Every Night.  Dispense: 30 capsule; Refill: 8  -  "    pramipexole (Mirapex) 0.5 MG tablet; Take 3 tablets in the morning, 2 tablets at noon and 2 tablets in the evening.  Dispense: 210 tablet; Refill: 5       Total time spent with the patient and coordinating patient care was 25 minutes.    Follow Up  No follow-ups on file.  Patient was given instructions and counseling regarding his condition or for health maintenance advice. Please see specific information pulled into the AVS if appropriate.

## 2022-02-21 NOTE — TELEPHONE ENCOUNTER
Spoke to  and explained to him that I contacted [facility] and gave them the information on the card from the [facility] to see what would be compatible with their pharmacy. I was told that, unfortunately, the [facility] write their prescriptions in a way that [facility] is not able to decipher what actual dose he is taking. I explained that the pharmacist did recommend that he start on the TRIMIX 15/1/10 and  agrees. I faxed that Rx to [facility] and notified  that they will be contacting him regarding the charge and delivery options.    will contact us if any other questions/conerns.

## 2022-05-02 ENCOUNTER — TELEPHONE (OUTPATIENT)
Dept: INTERNAL MEDICINE | Facility: CLINIC | Age: 82
End: 2022-05-02

## 2022-05-02 NOTE — TELEPHONE ENCOUNTER
Hub staff attempted to follow warm transfer process and was unsuccessful     Caller: AMBERLY STEINER    Relationship to patient: Emergency Contact    Best call back number: [phone]    Patient is needing: PATIENT WIFE WOULD LIKE A CALL BACK REGARDING LABS

## 2022-05-03 ENCOUNTER — LAB (OUTPATIENT)
Dept: LAB | Facility: HOSPITAL | Age: 82
End: 2022-05-03

## 2022-05-03 ENCOUNTER — TRANSCRIBE ORDERS (OUTPATIENT)
Dept: LAB | Facility: HOSPITAL | Age: 82
End: 2022-05-03

## 2022-05-03 DIAGNOSIS — E55.9 VITAMIN D DEFICIENCY: ICD-10-CM

## 2022-05-03 DIAGNOSIS — R53.83 OTHER FATIGUE: ICD-10-CM

## 2022-05-03 DIAGNOSIS — E78.2 MIXED HYPERLIPIDEMIA: ICD-10-CM

## 2022-05-03 DIAGNOSIS — I10 ESSENTIAL (PRIMARY) HYPERTENSION: Primary | ICD-10-CM

## 2022-05-03 DIAGNOSIS — C61 PROSTATE CANCER: ICD-10-CM

## 2022-05-03 DIAGNOSIS — I10 ESSENTIAL (PRIMARY) HYPERTENSION: ICD-10-CM

## 2022-05-03 LAB
25(OH)D3 SERPL-MCNC: 61.6 NG/ML (ref 30–100)
ALBUMIN SERPL-MCNC: 3.9 G/DL (ref 3.5–5.2)
ALBUMIN/GLOB SERPL: 1.5 G/DL
ALP SERPL-CCNC: 108 U/L (ref 39–117)
ALT SERPL W P-5'-P-CCNC: 17 U/L (ref 1–41)
ANION GAP SERPL CALCULATED.3IONS-SCNC: 10 MMOL/L (ref 5–15)
AST SERPL-CCNC: 19 U/L (ref 1–40)
BASOPHILS # BLD AUTO: 0.06 10*3/MM3 (ref 0–0.2)
BASOPHILS NFR BLD AUTO: 0.9 % (ref 0–1.5)
BILIRUB SERPL-MCNC: 0.4 MG/DL (ref 0–1.2)
BUN SERPL-MCNC: 19 MG/DL (ref 8–23)
BUN/CREAT SERPL: 21.1 (ref 7–25)
CALCIUM SPEC-SCNC: 9.2 MG/DL (ref 8.6–10.5)
CHLORIDE SERPL-SCNC: 104 MMOL/L (ref 98–107)
CHOLEST SERPL-MCNC: 163 MG/DL (ref 0–200)
CO2 SERPL-SCNC: 25 MMOL/L (ref 22–29)
CREAT SERPL-MCNC: 0.9 MG/DL (ref 0.76–1.27)
DEPRECATED RDW RBC AUTO: 41.4 FL (ref 37–54)
EGFRCR SERPLBLD CKD-EPI 2021: 85.8 ML/MIN/1.73
EOSINOPHIL # BLD AUTO: 0.24 10*3/MM3 (ref 0–0.4)
EOSINOPHIL NFR BLD AUTO: 3.6 % (ref 0.3–6.2)
ERYTHROCYTE [DISTWIDTH] IN BLOOD BY AUTOMATED COUNT: 14.3 % (ref 12.3–15.4)
FOLATE SERPL-MCNC: 16.9 NG/ML (ref 4.78–24.2)
GLOBULIN UR ELPH-MCNC: 2.6 GM/DL
GLUCOSE SERPL-MCNC: 90 MG/DL (ref 65–99)
HCT VFR BLD AUTO: 43.5 % (ref 37.5–51)
HDLC SERPL-MCNC: 41 MG/DL (ref 40–60)
HGB BLD-MCNC: 14.4 G/DL (ref 13–17.7)
IMM GRANULOCYTES # BLD AUTO: 0.01 10*3/MM3 (ref 0–0.05)
IMM GRANULOCYTES NFR BLD AUTO: 0.2 % (ref 0–0.5)
LDLC SERPL CALC-MCNC: 105 MG/DL (ref 0–100)
LDLC/HDLC SERPL: 2.54 {RATIO}
LYMPHOCYTES # BLD AUTO: 1.88 10*3/MM3 (ref 0.7–3.1)
LYMPHOCYTES NFR BLD AUTO: 28.6 % (ref 19.6–45.3)
MCH RBC QN AUTO: 26.2 PG (ref 26.6–33)
MCHC RBC AUTO-ENTMCNC: 33.1 G/DL (ref 31.5–35.7)
MCV RBC AUTO: 79.2 FL (ref 79–97)
MONOCYTES # BLD AUTO: 0.64 10*3/MM3 (ref 0.1–0.9)
MONOCYTES NFR BLD AUTO: 9.7 % (ref 5–12)
NEUTROPHILS NFR BLD AUTO: 3.75 10*3/MM3 (ref 1.7–7)
NEUTROPHILS NFR BLD AUTO: 57 % (ref 42.7–76)
NRBC BLD AUTO-RTO: 0 /100 WBC (ref 0–0.2)
PLATELET # BLD AUTO: 276 10*3/MM3 (ref 140–450)
PMV BLD AUTO: 10.1 FL (ref 6–12)
POTASSIUM SERPL-SCNC: 4.2 MMOL/L (ref 3.5–5.2)
PROT SERPL-MCNC: 6.5 G/DL (ref 6–8.5)
PSA SERPL-MCNC: <0.014 NG/ML (ref 0–4)
RBC # BLD AUTO: 5.49 10*6/MM3 (ref 4.14–5.8)
SODIUM SERPL-SCNC: 139 MMOL/L (ref 136–145)
T4 FREE SERPL-MCNC: 1.14 NG/DL (ref 0.93–1.7)
TRIGL SERPL-MCNC: 89 MG/DL (ref 0–150)
TSH SERPL DL<=0.05 MIU/L-ACNC: 1.13 UIU/ML (ref 0.27–4.2)
VIT B12 BLD-MCNC: 498 PG/ML (ref 211–946)
VLDLC SERPL-MCNC: 17 MG/DL (ref 5–40)
WBC NRBC COR # BLD: 6.58 10*3/MM3 (ref 3.4–10.8)

## 2022-05-03 PROCEDURE — 84443 ASSAY THYROID STIM HORMONE: CPT

## 2022-05-03 PROCEDURE — 84439 ASSAY OF FREE THYROXINE: CPT

## 2022-05-03 PROCEDURE — 82306 VITAMIN D 25 HYDROXY: CPT

## 2022-05-03 PROCEDURE — 82746 ASSAY OF FOLIC ACID SERUM: CPT

## 2022-05-03 PROCEDURE — 82607 VITAMIN B-12: CPT

## 2022-05-03 PROCEDURE — 36415 COLL VENOUS BLD VENIPUNCTURE: CPT

## 2022-05-03 PROCEDURE — 80061 LIPID PANEL: CPT

## 2022-05-03 PROCEDURE — 80053 COMPREHEN METABOLIC PANEL: CPT

## 2022-05-03 PROCEDURE — 84153 ASSAY OF PSA TOTAL: CPT

## 2022-05-03 PROCEDURE — 85025 COMPLETE CBC W/AUTO DIFF WBC: CPT

## 2022-05-05 ENCOUNTER — OFFICE VISIT (OUTPATIENT)
Dept: INTERNAL MEDICINE | Facility: CLINIC | Age: 82
End: 2022-05-05

## 2022-05-05 VITALS
HEART RATE: 55 BPM | SYSTOLIC BLOOD PRESSURE: 145 MMHG | HEIGHT: 70 IN | BODY MASS INDEX: 25.28 KG/M2 | TEMPERATURE: 97.9 F | WEIGHT: 176.6 LBS | OXYGEN SATURATION: 97 % | DIASTOLIC BLOOD PRESSURE: 57 MMHG

## 2022-05-05 DIAGNOSIS — G20 PARKINSON'S DISEASE: ICD-10-CM

## 2022-05-05 DIAGNOSIS — E55.9 VITAMIN D DEFICIENCY: ICD-10-CM

## 2022-05-05 DIAGNOSIS — G70.00 OCULAR MYASTHENIA GRAVIS: ICD-10-CM

## 2022-05-05 DIAGNOSIS — Z12.5 PROSTATE CANCER SCREENING: ICD-10-CM

## 2022-05-05 DIAGNOSIS — Z87.19 HISTORY OF ESOPHAGEAL STRICTURE: ICD-10-CM

## 2022-05-05 DIAGNOSIS — I10 PRIMARY HYPERTENSION: Primary | ICD-10-CM

## 2022-05-05 DIAGNOSIS — Z00.00 WELL ADULT EXAM: ICD-10-CM

## 2022-05-05 PROBLEM — R13.10 DYSPHAGIA: Status: ACTIVE | Noted: 2022-05-05

## 2022-05-05 PROBLEM — H90.72 MIXED CONDUCTIVE AND SENSORINEURAL HEARING LOSS OF LEFT EAR: Status: ACTIVE | Noted: 2022-05-05

## 2022-05-05 PROBLEM — H90.5 SENSORINEURAL HEARING LOSS (SNHL) OF RIGHT EAR: Status: ACTIVE | Noted: 2022-05-05

## 2022-05-05 PROCEDURE — 99214 OFFICE O/P EST MOD 30 MIN: CPT | Performed by: INTERNAL MEDICINE

## 2022-05-05 RX ORDER — LISINOPRIL 40 MG/1
40 TABLET ORAL DAILY
Qty: 90 TABLET | Refills: 3 | Status: SHIPPED | OUTPATIENT
Start: 2022-05-05

## 2022-05-05 RX ORDER — AMLODIPINE BESYLATE 10 MG/1
10 TABLET ORAL DAILY
Qty: 90 TABLET | Refills: 3 | Status: SHIPPED | OUTPATIENT
Start: 2022-05-05

## 2022-05-05 NOTE — ASSESSMENT & PLAN NOTE
Patient being followed closely by Dr. Spear, he saw him couple months ago, no changes to medications were recommended.  Patient with no falls, also reports no significant underlying tremors.

## 2022-05-05 NOTE — PROGRESS NOTES
"Chief Complaint  Hypertension (Pt here for check up) and GI Problem (History of esophageal strictures, combated with dilatation and making sure he chews his food well.)    Subjective      Lopez Nunez presents to [facility] INTER[facility] MEDICINE    History of Present Illness  Patient 81-year-old male with underlying hypertension, Parkinson's, ocular myasthenia, and reflux disease, who was coming in for routine 12-month evaluation.  We will review his meds, go over any labs obtained, and address any new concerns.    Review of Systems   Constitutional: Negative for appetite change, fatigue and fever.   HENT: Negative for congestion and ear pain.    Eyes: Negative for blurred vision.   Respiratory: Negative for cough, chest tightness, shortness of breath and wheezing.    Cardiovascular: Negative for chest pain, palpitations and leg swelling.   Gastrointestinal: Negative for abdominal pain.   Genitourinary: Negative for difficulty urinating, dysuria and hematuria.   Musculoskeletal: Negative for arthralgias and gait problem.   Skin: Negative for skin lesions.   Neurological: Negative for syncope, memory problem and confusion.   Psychiatric/Behavioral: Negative for self-injury and depressed mood.       Objective   Vital Signs:   /57   Pulse 55   Temp 97.9 °F (36.6 °C)   Ht 177.8 cm (70\")   Wt 80.1 kg (176 lb 9.6 oz)   SpO2 97%   BMI 25.34 kg/m²           Physical Exam  Vitals and nursing note reviewed.   Constitutional:       General: He is not in acute distress.     Appearance: Normal appearance. He is not toxic-appearing.   HENT:      Head: Atraumatic.      Right Ear: External ear normal.      Left Ear: External ear normal.      Nose: Nose normal.      Mouth/Throat:      Mouth: Mucous membranes are moist.   Eyes:      General:         Right eye: No discharge.         Left eye: No discharge.      Extraocular Movements: Extraocular movements intact.      Pupils: Pupils are equal, round, and " reactive to light.   Neck:      Comments: No carotid bruits.  Cardiovascular:      Rate and Rhythm: Normal rate and regular rhythm.      Pulses: Normal pulses.      Heart sounds: Normal heart sounds. No murmur heard.    No gallop.      Comments: Heart tones normal, no ectopy, no S3.  Pulmonary:      Effort: Pulmonary effort is normal. No respiratory distress.      Breath sounds: No wheezing, rhonchi or rales.   Abdominal:      General: There is no distension.      Palpations: Abdomen is soft. There is no mass.      Tenderness: There is no abdominal tenderness. There is no guarding.   Musculoskeletal:         General: No swelling or tenderness.      Cervical back: No tenderness.      Right lower leg: No edema.      Left lower leg: No edema.      Comments: No edema.   Skin:     General: Skin is warm and dry.      Findings: No rash.   Neurological:      General: No focal deficit present.      Mental Status: He is alert and oriented to person, place, and time. Mental status is at baseline.      Motor: No weakness.      Gait: Gait normal.   Psychiatric:         Mood and Affect: Mood normal.         Thought Content: Thought content normal.          Result Review   The following data was reviewed by: Joshua Ha MD on 05/05/2022:  [x] Laboratory  [] Microbiology  [] Radiology  [] EKG/telemetry  [] Cardiology/Vascular  [] Pathology  [x] Old records              Assessment and Plan   Diagnoses and all orders for this visit:    1. Primary hypertension (Primary)  Assessment & Plan:  Blood pressure remains well controlled as of his 5/22 appointment.  Patient gets systolics in the 1 10-1 20 range on average at home and at the gym.  Patient is stable to continue with full doses of lisinopril and amlodipine.    Orders:  -     Comprehensive Metabolic Panel; Future  -     Urinalysis With Culture If Indicated -; Future    2. Vitamin D deficiency  Assessment & Plan:  Vitamin D level 62 as of his 5/22 appointment.  Patient stable to  continue with low-dose over-the-counter supplementation.    Orders:  -     Vitamin D 1,25 Dihydroxy; Future    3. History of esophageal stricture  Overview:  Status post dilation x3, last in 2017 per Dr. Villa.    Assessment & Plan:  Patient with no dysphagia no significant dyspepsia on chronic PPI.  Patient stable continue low-dose Prilosec.      4. Ocular myasthenia gravis (HCC)  Assessment & Plan:  Patient is doing very well on this also, his laboratory parameters remain very normal, and he is maintained on the lowest dose.  Continue follow-up with neurology as scheduled.      5. Parkinson's disease (HCC)  Assessment & Plan:  Patient being followed closely by Dr. Spear, he saw him couple months ago, no changes to medications were recommended.  Patient with no falls, also reports no significant underlying tremors.      6. Well adult exam  -     CBC & Differential; Future  -     Lipid Panel; Future  -     TSH+Free T4; Future    7. Prostate cancer screening  -     PSA Screen; Future    Other orders  -     lisinopril (PRINIVIL,ZESTRIL) 40 MG tablet; Take 1 tablet by mouth Daily.  Dispense: 90 tablet; Refill: 3  -     amLODIPine (NORVASC) 10 MG tablet; Take 1 tablet by mouth Daily.  Dispense: 90 tablet; Refill: 3    --  --  OLDER NOTES:  URGENT VISIT 3/20:  EDEMA = L-sided, 6 months; neg h/o clot; exam neg CHF/DVT; = VS only related to CCB/requip/etc; d/w elevate/+/- teds; few more labs on RTO---> I reveiwed all labs at 5/20 OV=neg TSH/BNP 99/cortisol fine/urine neg; it resolved he says; will lower CCB if recurs.  --  --  VISIT 5/21:  (Wife, former RN, is my pt as well).  --  HTN stable; at [facility] qAM=7 miles/day per fitbit; no ischemic sxs=still working out/etc as of 5/21 OV (LDL 88/GLC neg in '20).  --  OCCULAR MYASTHENIA GRAVIS age 65 with visual changes and followed by [facility] as of 5/17 OV---> on Cellcept for this still per ? as of 5/21.  PARKINSON's per Dr Peters---> per Dr Alegre q 6 mo; no  falls.  --  S/P PARTIAL THYROIDECTOMY with NO meds needed--->TSH neg 5/20.  MULTINODULAR GOITER with increase bilaterally, so doubt need for bx despite radiologists recs...repeat 5/19 was unchanged.  --  R CAROTID BRUIT...dopplers neg 1/15.   --  VIT D DEF is fine 5/20.  --  BPH s/p bx (Dr Barker) x 2 for elevated PSA...no sxs 5/20, but I d/w PSA up and is julian with her soonish---> s/p radical prostatectomy 7/20 with neg margins.  --  --  PSA 6.8 (4/24/19)... 8.4 (4/27/20)---> 0.01 (5/3/21) and will defer to Dr Barker for now.  COLON defer (neg FH).  EGD 7/17 = benign stricture and is s/p dilation x2 per Dr Petty; no GERD, no sxs; H. Pylori was treated.  Hep A '18; Prevnar '18 x1-2?; Shingrix x 1 in fall '18; Pneumovax 1/14; COVID x2 as of 5/21.  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  Patient was given instructions and counseling regarding his condition or for health maintenance advice. Please see specific information pulled into the AVS if appropriate.

## 2022-05-05 NOTE — ASSESSMENT & PLAN NOTE
Patient is doing very well on this also, his laboratory parameters remain very normal, and he is maintained on the lowest dose.  Continue follow-up with neurology as scheduled.

## 2022-05-05 NOTE — ASSESSMENT & PLAN NOTE
Patient with no dysphagia no significant dyspepsia on chronic PPI.  Patient stable continue low-dose Prilosec.

## 2022-05-05 NOTE — ASSESSMENT & PLAN NOTE
Vitamin D level 62 as of his 5/22 appointment.  Patient stable to continue with low-dose over-the-counter supplementation.

## 2022-05-05 NOTE — ASSESSMENT & PLAN NOTE
Blood pressure remains well controlled as of his 5/22 appointment.  Patient gets systolics in the 1 10-1 20 range on average at home and at the gym.  Patient is stable to continue with full doses of lisinopril and amlodipine.

## 2022-05-16 ENCOUNTER — OFFICE VISIT (OUTPATIENT)
Dept: UROLOGY | Facility: CLINIC | Age: 82
End: 2022-05-16

## 2022-05-16 VITALS — BODY MASS INDEX: 25.05 KG/M2 | HEIGHT: 70 IN | WEIGHT: 175 LBS

## 2022-05-16 DIAGNOSIS — C61 PROSTATE CANCER: Primary | ICD-10-CM

## 2022-05-16 PROCEDURE — 81003 URINALYSIS AUTO W/O SCOPE: CPT | Performed by: UROLOGY

## 2022-05-16 PROCEDURE — 99213 OFFICE O/P EST LOW 20 MIN: CPT | Performed by: UROLOGY

## 2022-05-16 NOTE — PROGRESS NOTES
"Chief Complaint  Prostate Cancer (3M Follow up) and Lab Results (Pt would like to review results)    Subjective          Lopez Nunez presents to [facility] UROLOGY  History of Present Illness    Mr. Pelayo is a follow-up for prostate cancer. He is an 81-year-old gentleman initially diagnosed in 07/2020, at which time he had a laparoscopic robotic prostatectomy. His PSA at diagnosis was 8. His final [place] score was 4+3. It was a T3A tumor with negative margins. His PSA has remained undetectable out from surgery. Bone scan and CT scan prior to surgery were without evidence of metastasis. His most recent PSA from 05/03/2022, which I reviewed today is less than 0.014.    The patient reports he is doing well. He states over the last few months he has experienced inconsistency in his symptoms. He notes having 3-4 \"outstanding\" days, where one liner is enough, however, he will then have a couple of days where he needs to change the liner 2-3 times a day, primarily if he is doing heavy lifting or pushing. The patient reports he is otherwise very pleased with the results.     The patient is moving.     Objective   Vital Signs:   Ht 177.8 cm (70\")   Wt 79.4 kg (175 lb)   BMI 25.11 kg/m²     Physical Exam  Vitals and nursing note reviewed.   Constitutional:       Appearance: Normal appearance. He is well-developed.   Pulmonary:      Effort: Pulmonary effort is normal.      Breath sounds: Normal air entry.   Neurological:      Mental Status: He is alert and oriented to person, place, and time.      Motor: Motor function is intact.   Psychiatric:         Mood and Affect: Mood normal.         Behavior: Behavior normal.          Result Review :   The following data was reviewed by: Dede Barker MD on 05/16/2022:    Results for orders placed or performed in visit on 05/16/22   POC Urinalysis Dipstick, Automated    Specimen: Urine   Result Value Ref Range    Color Dark Yellow Yellow, Straw, Dark " Yellow, Barbara    Clarity, UA Clear Clear    Specific Gravity  1.030 1.005 - 1.030    pH, Urine 5.5 5.0 - 8.0    Leukocytes Negative Negative    Nitrite, UA Negative Negative    Protein, POC 2+ (A) Negative mg/dL    Glucose, UA Negative Negative, 1000 mg/dL (3+) mg/dL    Ketones, UA 15 mg/dL (A) Negative    Urobilinogen, UA Normal Normal    Bilirubin Small (1+) (A) Negative    Blood, UA Negative Negative    Lot Number [id number]     Expiration Date 02/28/23      PSA    PSA 10/21/21 2/9/22 5/3/22   PSA <0.014 <0.014 <0.014                  Assessment and Plan    Diagnoses and all orders for this visit:    1. Prostate cancer (HCC) (Primary)  -     POC Urinalysis Dipstick, Automated  -     PSA DIAGNOSTIC; Future    PSA and appt in 6 months or sooner if needed.          Follow Up       Return in about 6 months (around 11/16/2022) for PSA prior to visit.  Patient was given instructions and counseling regarding his condition or for health maintenance advice. Please see specific information pulled into the AVS if appropriate.     Transcribed from ambient dictation for Dede Barker MD by Laura Almaraz.  05/16/22   10:26 EDT    Patient verbalized consent to the visit recording.

## 2022-06-20 ENCOUNTER — PRIOR AUTHORIZATION (OUTPATIENT)
Dept: NEUROLOGY | Facility: CLINIC | Age: 82
End: 2022-06-20

## 2022-06-20 NOTE — TELEPHONE ENCOUNTER
"Cellcept (Mycophenolate Mofetil) needs a PA. This request was from [facility] in [facility]. I called the previous pharmacy, [facility], as I have not had to get a PA in the past and he has been taking it for a long time and they advised me that the pt had been using a GoodRx discount and it was $23.40 for 90 day supply. I will get a PA for it and the pt can call if there is a problem with affordability. PA sent to plan. Awaiting case determination.     \"PA Case: [id number], Status: Approved, Coverage Starts on: 3/21/2022 12:00:00 AM, Coverage Ends on: 6/20/2023 12:00:00 AM.\" per Covermymeds and approval letter that has been scanned into chart.         "

## 2022-07-15 RX ORDER — PRAMIPEXOLE DIHYDROCHLORIDE 0.5 MG/1
TABLET ORAL
Qty: 210 TABLET | Refills: 3 | Status: SHIPPED | OUTPATIENT
Start: 2022-07-15 | End: 2022-10-21 | Stop reason: SDUPTHER

## 2022-10-10 ENCOUNTER — TELEPHONE (OUTPATIENT)
Dept: GASTROENTEROLOGY | Facility: CLINIC | Age: 82
End: 2022-10-10

## 2022-10-10 NOTE — TELEPHONE ENCOUNTER
Patient wife left voice message to reschedule  appointment. Called patient but had to leave a voice message.

## 2022-10-21 ENCOUNTER — LAB (OUTPATIENT)
Dept: LAB | Facility: HOSPITAL | Age: 82
End: 2022-10-21

## 2022-10-21 ENCOUNTER — OFFICE VISIT (OUTPATIENT)
Dept: NEUROLOGY | Facility: CLINIC | Age: 82
End: 2022-10-21

## 2022-10-21 VITALS
SYSTOLIC BLOOD PRESSURE: 141 MMHG | HEART RATE: 54 BPM | WEIGHT: 178.9 LBS | BODY MASS INDEX: 25.61 KG/M2 | HEIGHT: 70 IN | DIASTOLIC BLOOD PRESSURE: 48 MMHG

## 2022-10-21 DIAGNOSIS — C61 PROSTATE CANCER: ICD-10-CM

## 2022-10-21 DIAGNOSIS — G70.00 OCULAR MYASTHENIA GRAVIS: Primary | ICD-10-CM

## 2022-10-21 DIAGNOSIS — G20 PARKINSON'S DISEASE: ICD-10-CM

## 2022-10-21 LAB — PSA SERPL-MCNC: <0.014 NG/ML (ref 0–4)

## 2022-10-21 PROCEDURE — 84153 ASSAY OF PSA TOTAL: CPT

## 2022-10-21 PROCEDURE — 36415 COLL VENOUS BLD VENIPUNCTURE: CPT

## 2022-10-21 PROCEDURE — 99215 OFFICE O/P EST HI 40 MIN: CPT | Performed by: PSYCHIATRY & NEUROLOGY

## 2022-10-21 RX ORDER — MYCOPHENOLATE MOFETIL 250 MG/1
250 CAPSULE ORAL NIGHTLY
Qty: 90 CAPSULE | Refills: 2 | Status: SHIPPED | OUTPATIENT
Start: 2022-10-21 | End: 2023-03-10 | Stop reason: SDUPTHER

## 2022-10-21 RX ORDER — PRAMIPEXOLE DIHYDROCHLORIDE 0.5 MG/1
TABLET ORAL
Qty: 630 TABLET | Refills: 2 | Status: SHIPPED | OUTPATIENT
Start: 2022-10-21 | End: 2023-03-10 | Stop reason: SDUPTHER

## 2022-10-21 NOTE — ASSESSMENT & PLAN NOTE
He is really taking the same dose of carbidopa/levodopa 25/100 2 tablets 3 times a day at 9 AM, 12 noon and 4 PM and pramipexole 0.5 mg 3 tablets at 9 AM, 2 tablets at noon and 4 PM.  I will see him again in 6 months time for follow-up.

## 2022-10-21 NOTE — PROGRESS NOTES
"Chief Complaint  Parkinson's Disease    Subjective          Lopez Nunez is a 82 y.o. male who presents to [facility]UROLOG[facility] & NEUROSURGERY  History of Present Illness  82-year-old man here for follow-up his Parkinson's disease, ocular myasthenia.  He then moved to [place] which is between [place] and [facility].  They want to continue seeing me as well as their urologist Dr. Ferreira.  He is independent all activities of daily living.  They are staying with friends here.  Their family is in [place].  He is able to do all activities of daily living without difficulty.  He states that he was mowing his son's lawn of 2 acres twice a week and then once a week now.  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  He does the gym on a daily basis in which she does exercises in the swimming and exercises in the pool.  Is also doing treadmill, bicycling.  He is taking carbidopa/levodopa 25/100 2 tablets at 9 AM, 2 tablets at noon and 2 tablets at 4 PM.  He is also taking pramipexole 0.5 mg 3 At 9 AM, 2 tablets at noon and 2 tablets at 4 PM.  Is also taking CellCept for his myasthenia 250 mg daily.  He has no problems.  He states that he has no tremor.  He has no problems with walking.  His wife is with him today.    I reviewed the previous notes and he has had a DaTscan which was positive in the past.    Objective   Vital Signs:   /48   Pulse 54   Ht 177.8 cm (70\")   Wt 81.1 kg (178 lb 14.4 oz)   BMI 25.67 kg/m²     Physical Exam   He is alert, fluent, phasic, follows commands well.  There is no tremor.  There is no weakness of the upper or lower extremities.  There is no facial weakness.  On station gait is able to tiptoe, heel walk without difficulty.  Armswing is normal and turning is intact.  Heart is regular rhythm normal in rate.        Assessment and Plan  Diagnoses and all orders for this visit:    1. Ocular myasthenia gravis (HCC) (Primary)  Assessment & Plan:  He is to continue taking " CellCept 250 mg daily.      2. Parkinson's disease (HCC)  Assessment & Plan:  He is really taking the same dose of carbidopa/levodopa 25/100 2 tablets 3 times a day at 9 AM, 12 noon and 4 PM and pramipexole 0.5 mg 3 tablets at 9 AM, 2 tablets at noon and 4 PM.  I will see him again in 6 months time for follow-up.      Other orders  -     carbidopa-levodopa (SINEMET)  MG per tablet; Take 2 tablets 3 times a day.  Dispense: 540 tablet; Refill: 2  -     mycophenolate (CELLCEPT) 250 MG capsule; Take 1 capsule by mouth Every Night.  Dispense: 90 capsule; Refill: 2  -     pramipexole (Mirapex) 0.5 MG tablet; Take 3 tablets in the morning, 2 tablets at noon and 2 tablets in the evening.  Dispense: 630 tablet; Refill: 2       Total time spent with the patient and coordinating patient care was 40 minutes.    Follow Up  No follow-ups on file.  Patient was given instructions and counseling regarding his condition or for health maintenance advice. Please see specific information pulled into the AVS if appropriate.

## 2022-10-24 ENCOUNTER — OFFICE VISIT (OUTPATIENT)
Dept: UROLOGY | Facility: CLINIC | Age: 82
End: 2022-10-24

## 2022-10-24 VITALS — HEIGHT: 70 IN | RESPIRATION RATE: 16 BRPM | BODY MASS INDEX: 25.28 KG/M2 | WEIGHT: 176.6 LBS

## 2022-10-24 DIAGNOSIS — N52.31 ERECTILE DYSFUNCTION AFTER RADICAL PROSTATECTOMY: Chronic | ICD-10-CM

## 2022-10-24 DIAGNOSIS — C61 PROSTATE CANCER: Primary | ICD-10-CM

## 2022-10-24 LAB
BILIRUB BLD-MCNC: NEGATIVE MG/DL
CLARITY, POC: CLEAR
COLOR UR: YELLOW
EXPIRATION DATE: NORMAL
GLUCOSE UR STRIP-MCNC: NEGATIVE MG/DL
KETONES UR QL: NEGATIVE
LEUKOCYTE EST, POC: NEGATIVE
Lab: NORMAL
NITRITE UR-MCNC: NEGATIVE MG/ML
PH UR: 5.5 [PH] (ref 5–8)
PROT UR STRIP-MCNC: NEGATIVE MG/DL
RBC # UR STRIP: NEGATIVE /UL
SP GR UR: 1.03 (ref 1–1.03)
UROBILINOGEN UR QL: NORMAL

## 2022-10-24 PROCEDURE — 99213 OFFICE O/P EST LOW 20 MIN: CPT | Performed by: UROLOGY

## 2022-10-24 PROCEDURE — 81003 URINALYSIS AUTO W/O SCOPE: CPT | Performed by: UROLOGY

## 2022-10-24 NOTE — PROGRESS NOTES
"Chief Complaint  Prostate Cancer    Subjective          Lopez Nunez presents to [facility] UROLOGY  History of Present Illness    The patient follows up for prostate cancer. He is an 82-year-old gentleman who had a initially diagnosed with prostate cancer in 07/2021, at which time he had a laparoscopic robotic prostatectomy. PSA at the time of diagnosis was 8, [place] score was 4+3. His bone scan and CT scan prior to surgery were without evidence of metastasis. His most recent PSA is less than 0.014 from 10/21/2022.    He reports that he is doing well. He states that he works out 5 to 6 days a week at the gym and swims. He reports that he mows his 2.5 acre lawn.    A review of systems was completed and positive findings are noted in the HPI.    Objective   Vital Signs:   Resp 16   Ht 177.8 cm (70\")   Wt 80.1 kg (176 lb 9.6 oz)   BMI 25.34 kg/m²       Physical Exam  Vitals and nursing note reviewed.   Constitutional:       Appearance: Normal appearance. He is well-developed.   Pulmonary:      Effort: Pulmonary effort is normal.      Breath sounds: Normal air entry.   Neurological:      Mental Status: He is alert and oriented to person, place, and time.      Motor: Motor function is intact.   Psychiatric:         Mood and Affect: Mood normal.         Behavior: Behavior normal.          Result Review :   The following data was reviewed by: Dede Barker MD on 10/24/2022:    Results for orders placed or performed in visit on 10/24/22   POC Urinalysis Dipstick, Automated    Specimen: Urine   Result Value Ref Range    Color Yellow Yellow, Straw, Dark Yellow, Barbara    Clarity, UA Clear Clear    Specific Gravity  1.030 1.005 - 1.030    pH, Urine 5.5 5.0 - 8.0    Leukocytes Negative Negative    Nitrite, UA Negative Negative    Protein, POC Negative Negative mg/dL    Glucose, UA Negative Negative mg/dL    Ketones, UA Negative Negative    Urobilinogen, UA 0.2 E.U./dL Normal, 0.2 E.U./dL    " Bilirubin Negative Negative    Blood, UA Negative Negative    Lot Number [id number]     Expiration Date 82,023      PSA    PSA 2/9/22 5/3/22 10/21/22   PSA <0.014 <0.014 <0.014                  Assessment and Plan    Diagnoses and all orders for this visit:    1. Prostate cancer (HCC) (Primary)  -     POC Urinalysis Dipstick, Automated  -     PSA DIAGNOSTIC; Future    2. Erectile dysfunction after radical prostatectomy      Prostate cancer  - We will see him back with a PSA in 6 months.    Erectile dysfunction  - We will refill his Trimix prescription, which actually needs to go to a new compounding pharmacy in [place].        Follow Up       Return in about 6 months (around 4/24/2023) for PSA prior to visit.  Patient was given instructions and counseling regarding his condition or for health maintenance advice. Please see specific information pulled into the AVS if appropriate.     Transcribed from ambient dictation for Dede Barker MD by Gena [employer] .  10/24/22   10:57 EDT    Patient or patient representative verbalized consent to the visit recording.  I have personally performed the services described in this document as transcribed by the above individual, and it is both accurate and complete.  Dede Barker MD  10/24/2022  16:34 EDT

## 2022-11-02 ENCOUNTER — TELEPHONE (OUTPATIENT)
Dept: UROLOGY | Facility: CLINIC | Age: 82
End: 2022-11-02

## 2022-11-02 NOTE — TELEPHONE ENCOUNTER
Spoke with PT and told him that I had faxed the order for his Trimix to [facility] pharmacy. I told him to give them 24 hours and to call them to make sure it was ready before going to pick it up. He voiced understanding.

## 2022-11-02 NOTE — TELEPHONE ENCOUNTER
Provider: DR VILLARREAL  Caller: Lopez Nunez  Relationship to Patient: SELF  Pharmacy: [facility],  [phone], FAX: [phone]  Phone Number: [phone]  Reason for Call: PT HAD APPT LAST WEEK AND DR VILLARREAL WAS SENDING A PRESCRIPTION FOR TRIMIX BUT PT STATING PHARMACY DIDN'T HAVE ANY RECORD OF IT. THE PHARMACY INFORMATION LISTED ABOVE. PT ALSO ASKING IF NEEDS TO LET DR VILLARREAL KNOW HOW MANY SYRINGES HE NEEDS - HE WILL NEED 12.     PLEASE GIVE PT A CALL BACK IF THERE ARE ANY QUESTIONS AND TO VERIFY THAT THE PRESCRIPTION WAS SENT OVER.   When was the patient last seen: 10-24-22

## 2023-03-09 ENCOUNTER — LAB (OUTPATIENT)
Dept: LAB | Facility: HOSPITAL | Age: 83
End: 2023-03-09
Payer: MEDICARE

## 2023-03-09 DIAGNOSIS — C61 PROSTATE CANCER: ICD-10-CM

## 2023-03-09 LAB — PSA SERPL-MCNC: <0.014 NG/ML (ref 0–4)

## 2023-03-09 PROCEDURE — 36415 COLL VENOUS BLD VENIPUNCTURE: CPT

## 2023-03-09 PROCEDURE — 84153 ASSAY OF PSA TOTAL: CPT

## 2023-03-10 ENCOUNTER — OFFICE VISIT (OUTPATIENT)
Dept: NEUROLOGY | Facility: CLINIC | Age: 83
End: 2023-03-10
Payer: MEDICARE

## 2023-03-10 VITALS
HEART RATE: 46 BPM | SYSTOLIC BLOOD PRESSURE: 154 MMHG | DIASTOLIC BLOOD PRESSURE: 49 MMHG | WEIGHT: 180 LBS | BODY MASS INDEX: 25.83 KG/M2

## 2023-03-10 DIAGNOSIS — G70.00 OCULAR MYASTHENIA GRAVIS: Primary | ICD-10-CM

## 2023-03-10 DIAGNOSIS — G20 PARKINSON'S DISEASE: ICD-10-CM

## 2023-03-10 PROCEDURE — 99214 OFFICE O/P EST MOD 30 MIN: CPT | Performed by: PSYCHIATRY & NEUROLOGY

## 2023-03-10 PROCEDURE — 3077F SYST BP >= 140 MM HG: CPT | Performed by: PSYCHIATRY & NEUROLOGY

## 2023-03-10 PROCEDURE — 3078F DIAST BP <80 MM HG: CPT | Performed by: PSYCHIATRY & NEUROLOGY

## 2023-03-10 RX ORDER — LATANOPROST 50 UG/ML
1 SOLUTION/ DROPS OPHTHALMIC
COMMUNITY
Start: 2023-03-01

## 2023-03-10 RX ORDER — PRAMIPEXOLE DIHYDROCHLORIDE 0.5 MG/1
TABLET ORAL
Qty: 630 TABLET | Refills: 3 | Status: SHIPPED | OUTPATIENT
Start: 2023-03-10

## 2023-03-10 RX ORDER — MYCOPHENOLATE MOFETIL 250 MG/1
250 CAPSULE ORAL NIGHTLY
Qty: 90 CAPSULE | Refills: 3 | Status: SHIPPED | OUTPATIENT
Start: 2023-03-10

## 2023-03-10 NOTE — PROGRESS NOTES
Chief Complaint  Ocular myasthenia gravis and Parkinson's Disease    Subjective          Lopez Nunez is a 82 y.o. male who presents to [facility] NEUROLOGY & NEUROSURGERY  History of Present Illness  82-year-old man here for follow-up of his Parkinson's disease and ocular myasthenia.  They drove here from [facility] to follow-up with me and urology Dr. Ferreira.  They are staying in an air B&B.  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  He is independent with all activities of daily living.  He has no deterioration from the last clinic visit that I saw him.  There is no weakness of his upper or lower extremities.  He goes to the gym and exercises 5 days a week.  He has no problems with double vision, ptosis or swallowing difficulties.  His primary care provider is following his laboratory work-up.    Objective   Vital Signs:   /49   Pulse (!) 46   Wt 81.6 kg (180 lb)   BMI 25.83 kg/m²     Physical Exam   Alert, fluent, phasic, follows commands well.  There is no facial weakness.  There is no weakness of the orbicularis oculi muscle.  Soft palate elevation and tongue are normal.  There is no weakness of the upper or lower extremities.  And Station gait is able to tiptoe, heel walk, joel without difficulty.  Armswing is normal and turning is intact.  Heart is regular rhythm normal in rate.        Assessment and Plan  Diagnoses and all orders for this visit:    1. Ocular myasthenia gravis (HCC) (Primary)  Assessment & Plan:  He is to continue taking CellCept.  I discussed with him and his wife that he needs to get CBCs and comprehensive metabolic profiles checked by his primary care provider in [place] periodically make sure that his bone marrow is not suppressed and liver function testing is normal.    I will see him again in 1 years time for follow-up.      2. Parkinson's disease (HCC)  Assessment & Plan:  He is to  continue taking carbidopa/levodopa 25/100 2 tablets 3 times a day and Mirapex 0.5 mg 3 tablets in the morning, 2 tablets in the afternoon and 2 tablets in the evening at 9 AM, 12 noon and 4 PM.      Other orders  -     carbidopa-levodopa (SINEMET)  MG per tablet; Take 2 tablets 3 times a day.  Dispense: 540 tablet; Refill: 3  -     mycophenolate (CELLCEPT) 250 MG capsule; Take 1 capsule by mouth Every Night.  Dispense: 90 capsule; Refill: 3  -     pramipexole (Mirapex) 0.5 MG tablet; Take 3 tablets in the morning, 2 tablets at noon and 2 tablets in the evening.  Dispense: 630 tablet; Refill: 3       Total time spent with the patient and coordinating patient care was 35 minutes.    Follow Up  No follow-ups on file.  Patient was given instructions and counseling regarding his condition or for health maintenance advice. Please see specific information pulled into the AVS if appropriate.

## 2023-03-10 NOTE — ASSESSMENT & PLAN NOTE
He is to continue taking carbidopa/levodopa 25/100 2 tablets 3 times a day and Mirapex 0.5 mg 3 tablets in the morning, 2 tablets in the afternoon and 2 tablets in the evening at 9 AM, 12 noon and 4 PM.

## 2023-03-10 NOTE — ASSESSMENT & PLAN NOTE
He is to continue taking CellCept.  I discussed with him and his wife that he needs to get CBCs and comprehensive metabolic profiles checked by his primary care provider in [place] periodically make sure that his bone marrow is not suppressed and liver function testing is normal.    I will see him again in 1 years time for follow-up.

## 2023-03-13 ENCOUNTER — OFFICE VISIT (OUTPATIENT)
Dept: UROLOGY | Facility: CLINIC | Age: 83
End: 2023-03-13
Payer: MEDICARE

## 2023-03-13 VITALS — BODY MASS INDEX: 26.23 KG/M2 | HEIGHT: 70 IN | WEIGHT: 183.2 LBS

## 2023-03-13 DIAGNOSIS — C61 PROSTATE CANCER: Primary | ICD-10-CM

## 2023-03-13 LAB
BILIRUB BLD-MCNC: NEGATIVE MG/DL
CLARITY, POC: CLEAR
COLOR UR: YELLOW
EXPIRATION DATE: ABNORMAL
GLUCOSE UR STRIP-MCNC: NEGATIVE MG/DL
KETONES UR QL: ABNORMAL
LEUKOCYTE EST, POC: NEGATIVE
Lab: ABNORMAL
NITRITE UR-MCNC: NEGATIVE MG/ML
PH UR: 5 [PH] (ref 5–8)
PROT UR STRIP-MCNC: ABNORMAL MG/DL
RBC # UR STRIP: NEGATIVE /UL
SP GR UR: 1.03 (ref 1–1.03)
UROBILINOGEN UR QL: ABNORMAL

## 2023-03-13 PROCEDURE — 99213 OFFICE O/P EST LOW 20 MIN: CPT | Performed by: UROLOGY

## 2023-03-13 PROCEDURE — 81003 URINALYSIS AUTO W/O SCOPE: CPT | Performed by: UROLOGY

## 2023-03-13 PROCEDURE — 1159F MED LIST DOCD IN RCRD: CPT | Performed by: UROLOGY

## 2023-03-13 PROCEDURE — 1160F RVW MEDS BY RX/DR IN RCRD: CPT | Performed by: UROLOGY

## 2023-03-13 NOTE — PROGRESS NOTES
"Chief Complaint  Prostate Cancer    Subjective          Lopez Nunez presents to [facility] UROLOGY  History of Present Illness  The patient follows up for prostate cancer. He is an 82-year-old gentleman who had a initially diagnosed with prostate cancer in 07/2021, at which time he had a laparoscopic robotic prostatectomy. PSA at the time of diagnosis was 8, [place] score was 4+3. His bone scan and CT scan prior to surgery were without evidence of metastasis. His most recent PSA is less than 0.014 from March 2023.  Prostate Cancer      The patient presents today for a follow-up. He is accompanied by an adult female.    The patient reports that he is doing well. He states that he does have some leakage. He reports that he does Kegel exercises. He states that when he urinates, he stops and starts.    A review of systems was completed and positive findings are noted in the HPI.    Objective   Vital Signs:   Ht 177.8 cm (70\")   Wt 83.1 kg (183 lb 3.2 oz)   BMI 26.29 kg/m²       Physical Exam  Vitals and nursing note reviewed.   Constitutional:       Appearance: Normal appearance. He is well-developed.   Pulmonary:      Effort: Pulmonary effort is normal.      Breath sounds: Normal air entry.   Neurological:      Mental Status: He is alert and oriented to person, place, and time.      Motor: Motor function is intact.   Psychiatric:         Mood and Affect: Mood normal.         Behavior: Behavior normal.          Result Review :   The following data was reviewed by: Dede Barker MD on 03/13/2023:    Results for orders placed or performed in visit on 03/13/23   POC Urinalysis Dipstick, Automated    Specimen: Urine   Result Value Ref Range    Color Yellow Yellow, Straw, Dark Yellow, Barbara    Clarity, UA Clear Clear    Specific Gravity  1.030 1.005 - 1.030    pH, Urine 5.0 5.0 - 8.0    Leukocytes Negative Negative    Nitrite, UA Negative Negative    Protein, POC 30 mg/dL (A) Negative mg/dL    " Glucose, UA Negative Negative mg/dL    Ketones, UA 15 mg/dL (A) Negative    Urobilinogen, UA 0.2 E.U./dL Normal, 0.2 E.U./dL    Bilirubin Negative Negative    Blood, UA Negative Negative    Lot Number [id number]     Expiration Date 42,024        PSA    PSA 5/3/22 10/21/22 3/9/23   PSA <0.014 <0.014 <0.014                  Assessment and Plan    Diagnoses and all orders for this visit:    1. Prostate cancer (HCC) (Primary)  -     POC Urinalysis Dipstick, Automated  -     PSA DIAGNOSTIC; Future    1. PSA.  - PSA in 6 months.  - Also discussed bulk of his possible option for his urinary incontinence and we will discuss that further at his next appointment or sooner if we have a scope available to do it.        Follow Up       No follow-ups on file.  Patient was given instructions and counseling regarding his condition or for health maintenance advice. Please see specific information pulled into the AVS if appropriate.     Transcribed from ambient dictation for Dede Barker MD by Bianca Sky, Quality .  03/13/23   11:37 EDT    Patient or patient representative verbalized consent to the visit recording.  I have personally performed the services described in this document as transcribed by the above individual, and it is both accurate and complete.  Dede Barker MD  3/14/2023  17:32 EDT

## 2023-04-06 ENCOUNTER — TELEPHONE (OUTPATIENT)
Dept: INTERNAL MEDICINE | Facility: CLINIC | Age: 83
End: 2023-04-06
Payer: MEDICARE

## 2023-04-06 NOTE — TELEPHONE ENCOUNTER
Left patient MyChart message and voice mail on phone regarding correct address as well as possible rescheduling of his yearly follow up with Dr. Ha. Patient cancelled his recent appt with him.      Address shows as [place], [place]  - we need to verify this is correct.     Melissa,

## 2023-05-01 ENCOUNTER — TELEPHONE (OUTPATIENT)
Dept: INTERNAL MEDICINE | Facility: CLINIC | Age: 83
End: 2023-05-01
Payer: MEDICARE

## 2023-05-01 NOTE — TELEPHONE ENCOUNTER
FYI:  Second phone call attempted to contact patient to verify correct address. Message left on voice mail.    Voice mail and a SnapAppointments message re: same was also sent on April 6, 2023. No response, unable to contact patient.      Melissa Caldwell -

## 2023-06-07 ENCOUNTER — TELEPHONE (OUTPATIENT)
Dept: NEUROLOGY | Facility: CLINIC | Age: 83
End: 2023-06-07
Payer: MEDICARE

## 2023-06-07 NOTE — TELEPHONE ENCOUNTER
Caller: JATINAMBERLY    Relationship: Emergency Contact; [employer]    Best call back number: [phone]    What was the call regarding: PT'S WIFE STATES SHE SPOKE W/ PT/S PCP, DR. ANDI MEDINA @ [facility], AND HE DOES NOT FEEL COMFORTABLE ORDERING THE CBC AND CMP LABS FOR PT THAT DR. WATSON REQUESTED BE ORDERED DURING PT'S LAST VISIT ON 3/10/23.    PT'S WIFE STATES THAT SHE AND PT WILL BE IN [place] FROM JUNE 22-26, 2023 AND WOULD LIKE TO KNOW IF DR. WATSON COULD PLACE THE LAB ORDERS FOR PT TO HAVE LABS COMPLETED AT  [facility] NEXT DOOR TO THE OFFICE.     Do you require a callback: YES, PLEASE.    PLEASE REVIEW AND ADVISE

## 2023-06-08 DIAGNOSIS — G70.00 OCULAR MYASTHENIA GRAVIS: Primary | ICD-10-CM

## 2023-08-23 ENCOUNTER — PRIOR AUTHORIZATION (OUTPATIENT)
Dept: NEUROLOGY | Facility: CLINIC | Age: 83
End: 2023-08-23
Payer: MEDICARE

## 2023-08-23 ENCOUNTER — TELEPHONE (OUTPATIENT)
Dept: NEUROLOGY | Facility: CLINIC | Age: 83
End: 2023-08-23
Payer: MEDICARE

## 2023-08-23 NOTE — TELEPHONE ENCOUNTER
PA submitted through [facility]     PA Case: [id number], Status: Approved, Coverage Starts on: 5/24/2023 12:00:00 AM, Coverage Ends on: 8/22/2024 12:00:00 AM.

## 2023-08-23 NOTE — TELEPHONE ENCOUNTER
Left voicemail to pt that PA was approved, [facility] has it and will order the medication. [facility] staff asked that pt call after 4pm 8/24/23 to be sure they received medication. Instructed to notify our office with any questions.

## 2023-08-23 NOTE — TELEPHONE ENCOUNTER
Caller: AMBERLY STEINER    Relationship: Emergency Contact    Best call back number: [phone]    What form or medical record are you requesting: PRIOR AUTH FOR CELLCEPT    Who is requesting this form or medical record from you: [facility]    How would you like to receive the form or medical records (pick-up, mail, fax): FAX  If fax, what is the fax number: [phone]      Timeframe paperwork needed: ASAP    Additional notes: PATIENT CAN'T GET HIS MEDICATION BECAUSE [facility] IS NEEDING A PRIOR AUTH FOR CELLCEPT.

## 2023-09-08 ENCOUNTER — LAB (OUTPATIENT)
Dept: LAB | Facility: HOSPITAL | Age: 83
End: 2023-09-08
Payer: MEDICARE

## 2023-09-08 DIAGNOSIS — C61 PROSTATE CANCER: ICD-10-CM

## 2023-09-08 LAB — PSA SERPL-MCNC: <0.014 NG/ML (ref 0–4)

## 2023-09-08 PROCEDURE — 84153 ASSAY OF PSA TOTAL: CPT

## 2023-09-08 PROCEDURE — 36415 COLL VENOUS BLD VENIPUNCTURE: CPT

## 2023-09-11 ENCOUNTER — OFFICE VISIT (OUTPATIENT)
Dept: UROLOGY | Facility: CLINIC | Age: 83
End: 2023-09-11
Payer: MEDICARE

## 2023-09-11 VITALS — BODY MASS INDEX: 24.02 KG/M2 | WEIGHT: 167.8 LBS | HEIGHT: 70 IN

## 2023-09-11 DIAGNOSIS — C61 PROSTATE CANCER: Primary | ICD-10-CM

## 2023-09-11 LAB
BILIRUB BLD-MCNC: NEGATIVE MG/DL
CLARITY, POC: CLEAR
COLOR UR: YELLOW
EXPIRATION DATE: ABNORMAL
GLUCOSE UR STRIP-MCNC: NEGATIVE MG/DL
KETONES UR QL: ABNORMAL
LEUKOCYTE EST, POC: NEGATIVE
Lab: ABNORMAL
NITRITE UR-MCNC: NEGATIVE MG/ML
PH UR: 5 [PH] (ref 5–8)
PROT UR STRIP-MCNC: NEGATIVE MG/DL
RBC # UR STRIP: NEGATIVE /UL
SP GR UR: 1.02 (ref 1–1.03)
UROBILINOGEN UR QL: ABNORMAL

## 2023-09-11 PROCEDURE — 1159F MED LIST DOCD IN RCRD: CPT | Performed by: UROLOGY

## 2023-09-11 PROCEDURE — 99213 OFFICE O/P EST LOW 20 MIN: CPT | Performed by: UROLOGY

## 2023-09-11 PROCEDURE — 81003 URINALYSIS AUTO W/O SCOPE: CPT | Performed by: UROLOGY

## 2023-09-11 PROCEDURE — 1160F RVW MEDS BY RX/DR IN RCRD: CPT | Performed by: UROLOGY

## 2023-09-11 RX ORDER — HYDROCHLOROTHIAZIDE 12.5 MG/1
12.5 TABLET ORAL DAILY
Qty: 30 TABLET | Refills: 5 | COMMUNITY
Start: 2023-06-16 | End: 2023-12-13

## 2023-09-11 NOTE — PROGRESS NOTES
"Chief Complaint  Prostate Cancer    Subjective          Lopez Nunez presents to [facility] UROLOGY    History of Present Illness  The patient follows up for prostate cancer. He is an 83-year-old gentleman who had a initially diagnosed with prostate cancer in 07/2021, at which time he had a laparoscopic robotic prostatectomy. PSA at the time of diagnosis was 8, [place] score was 4+3. His bone scan and CT scan prior to surgery were without evidence of metastasis.     His most recent PSA is less than 0.014 from March 2023.      Objective   Vital Signs:   Ht 177.8 cm (70\")   Wt 76.1 kg (167 lb 12.8 oz)   BMI 24.08 kg/m²       Physical Exam  Vitals and nursing note reviewed.   Constitutional:       Appearance: Normal appearance. He is well-developed.   Pulmonary:      Effort: Pulmonary effort is normal.      Breath sounds: Normal air entry.   Neurological:      Mental Status: He is alert and oriented to person, place, and time.      Motor: Motor function is intact.   Psychiatric:         Mood and Affect: Mood normal.         Behavior: Behavior normal.        Result Review :   The following data was reviewed by: Dede Barker MD on 09/11/2023:      Results for orders placed or performed in visit on 09/11/23   POC Urinalysis Dipstick, Automated    Specimen: Urine   Result Value Ref Range    Color Yellow Yellow, Straw, Dark Yellow, Barbara    Clarity, UA Clear Clear    Specific Gravity  1.025 1.005 - 1.030    pH, Urine 5.0 5.0 - 8.0    Leukocytes Negative Negative    Nitrite, UA Negative Negative    Protein, POC Negative Negative mg/dL    Glucose, UA Negative Negative mg/dL    Ketones, UA 15 mg/dL (A) Negative    Urobilinogen, UA 0.2 E.U./dL Normal, 0.2 E.U./dL    Bilirubin Negative Negative    Blood, UA Negative Negative    Lot Number [id number]     Expiration Date 92,024          PSA          10/21/2022    10:00 3/9/2023    11:21 9/8/2023    09:30   PSA   PSA <0.014  <0.014  <0.014             "   Assessment and Plan    Diagnoses and all orders for this visit:    1. Prostate cancer (Primary)  -     POC Urinalysis Dipstick, Automated  -     PSA DIAGNOSTIC; Future    Repeat PSA and follow-up in 6 months.  We will discuss Bulkamid again at that visit.        Follow Up       Return in about 6 months (around 3/11/2024) for PSA prior to visit.  Patient was given instructions and counseling regarding his condition or for health maintenance advice. Please see specific information pulled into the AVS if appropriate.

## 2024-03-11 ENCOUNTER — OFFICE VISIT (OUTPATIENT)
Dept: NEUROLOGY | Facility: CLINIC | Age: 84
End: 2024-03-11
Payer: MEDICARE

## 2024-03-11 VITALS
BODY MASS INDEX: 26.05 KG/M2 | HEIGHT: 70 IN | WEIGHT: 182 LBS | SYSTOLIC BLOOD PRESSURE: 155 MMHG | DIASTOLIC BLOOD PRESSURE: 54 MMHG | HEART RATE: 53 BPM

## 2024-03-11 DIAGNOSIS — G70.00 OCULAR MYASTHENIA GRAVIS: ICD-10-CM

## 2024-03-11 DIAGNOSIS — G20.A1 PARKINSON'S DISEASE WITHOUT DYSKINESIA OR FLUCTUATING MANIFESTATIONS: Primary | ICD-10-CM

## 2024-03-11 PROCEDURE — 3077F SYST BP >= 140 MM HG: CPT | Performed by: PSYCHIATRY & NEUROLOGY

## 2024-03-11 PROCEDURE — 3078F DIAST BP <80 MM HG: CPT | Performed by: PSYCHIATRY & NEUROLOGY

## 2024-03-11 PROCEDURE — 99214 OFFICE O/P EST MOD 30 MIN: CPT | Performed by: PSYCHIATRY & NEUROLOGY

## 2024-03-11 PROCEDURE — 1159F MED LIST DOCD IN RCRD: CPT | Performed by: PSYCHIATRY & NEUROLOGY

## 2024-03-11 PROCEDURE — 1160F RVW MEDS BY RX/DR IN RCRD: CPT | Performed by: PSYCHIATRY & NEUROLOGY

## 2024-03-11 RX ORDER — MYCOPHENOLATE MOFETIL 250 MG/1
250 CAPSULE ORAL NIGHTLY
Qty: 90 CAPSULE | Refills: 3 | Status: SHIPPED | OUTPATIENT
Start: 2024-03-11

## 2024-03-11 RX ORDER — PRAMIPEXOLE DIHYDROCHLORIDE 0.5 MG/1
TABLET ORAL
Qty: 630 TABLET | Refills: 3 | Status: SHIPPED | OUTPATIENT
Start: 2024-03-11

## 2024-03-11 NOTE — ASSESSMENT & PLAN NOTE
He is to continue taking carbidopa/levodopa and pramipexole.  I will see him again in 1 years time for follow-up.  He is to have laboratory workup to follow-up his liver function testing and CBC.  Thank you for letting me participate in his care.

## 2024-03-11 NOTE — PROGRESS NOTES
"Chief Complaint  Follow-up and Med Refill    Subjective          Lopez Nunez is a 83 y.o. male who presents to [facility]  History of Present Illness  83-year-old man here for follow-up his Parkinson disease and ocular myasthenia.  He is doing well with all activities of daily living.  They are here today for her yearly trek [place] to be with their friends to watch school basketball afford her grandchildren.  While they are here today visit me as well as the urologist.  He had recent laboratory workup which there was a CBC and a basic metabolic profile which is unremarkable.  He continues to take CellCept.  He has no adverse effects.  This was given to him by Dr. Rivera years ago.  He is still teaching into college online and he needs his vision which he gets double vision in the past.  In regards to his Parkinson's disease he had a positive DaTscan and is able to do all activities of daily living including swimming, exercising in the gym.  At times he has dyskinesias of his head which she can stop.  He has tremor of his chin.  It comes on and off.  He is taking carbidopa/levodopa 25/100 2 tablets 3 times a day and pramipexole 0.5 mg 3 tablets in the morning, 2 tablets at noon and 2 tablets in the evening.  Is also taking mycophenolate 250 mg nightly for ocular myasthenia.  He never had generalized myasthenia however he has responded well to mycophenolate.    Objective   Vital Signs:   /54   Pulse 53   Ht 177.8 cm (70\")   Wt 82.6 kg (182 lb)   BMI 26.11 kg/m²     Physical Exam   Alert, fluent, phasic, follows commands well.  EOMs full all directions gaze, facial strength is full.  There is no weakness of the upper or lower extremities.  There is a chin tremor that is mild.  On station gait he is able to ambulate and he voluntarily starts moving his arm and is able to move his arm with normal armswing.  There is no tremor.        Assessment and " Plan  Diagnoses and all orders for this visit:    1. Parkinson's disease without dyskinesia or fluctuating manifestations (Primary)  Assessment & Plan:  He is to continue taking carbidopa/levodopa and pramipexole.  I will see him again in 1 years time for follow-up.  He is to have laboratory workup to follow-up his liver function testing and CBC.  Thank you for letting me participate in his care.    Orders:  -     Comprehensive Metabolic Panel; Future  -     CBC & Differential; Future    2. Ocular myasthenia gravis  Assessment & Plan:  He is to continue taking mycophenolate 250 mg nightly.        Other orders  -     pramipexole (Mirapex) 0.5 MG tablet; Take 3 tablets in the morning, 2 tablets at noon and 2 tablets in the evening.  Dispense: 630 tablet; Refill: 3  -     mycophenolate (CELLCEPT) 250 MG capsule; Take 1 capsule by mouth Every Night.  Dispense: 90 capsule; Refill: 3  -     carbidopa-levodopa (SINEMET)  MG per tablet; Take 2 tablets 3 times a day.  Dispense: 540 tablet; Refill: 3         Total time spent with the patient and coordinating patient care was 30 minutes.    Follow Up  No follow-ups on file.  Patient was given instructions and counseling regarding his condition or for health maintenance advice. Please see specific information pulled into the AVS if appropriate.

## 2024-03-12 ENCOUNTER — LAB (OUTPATIENT)
Dept: LAB | Facility: HOSPITAL | Age: 84
End: 2024-03-12
Payer: MEDICARE

## 2024-03-12 DIAGNOSIS — G20.A1 PARKINSON'S DISEASE WITHOUT DYSKINESIA OR FLUCTUATING MANIFESTATIONS: ICD-10-CM

## 2024-03-12 DIAGNOSIS — C61 PROSTATE CANCER: ICD-10-CM

## 2024-03-12 LAB
ALBUMIN SERPL-MCNC: 4.1 G/DL (ref 3.5–5.2)
ALBUMIN/GLOB SERPL: 1.6 G/DL
ALP SERPL-CCNC: 88 U/L (ref 39–117)
ALT SERPL W P-5'-P-CCNC: 6 U/L (ref 1–41)
ANION GAP SERPL CALCULATED.3IONS-SCNC: 11 MMOL/L (ref 5–15)
AST SERPL-CCNC: 17 U/L (ref 1–40)
BASOPHILS # BLD AUTO: 0.06 10*3/MM3 (ref 0–0.2)
BASOPHILS NFR BLD AUTO: 0.8 % (ref 0–1.5)
BILIRUB SERPL-MCNC: 0.4 MG/DL (ref 0–1.2)
BUN SERPL-MCNC: 23 MG/DL (ref 8–23)
BUN/CREAT SERPL: 22.8 (ref 7–25)
CALCIUM SPEC-SCNC: 9.1 MG/DL (ref 8.6–10.5)
CHLORIDE SERPL-SCNC: 103 MMOL/L (ref 98–107)
CO2 SERPL-SCNC: 28 MMOL/L (ref 22–29)
CREAT SERPL-MCNC: 1.01 MG/DL (ref 0.76–1.27)
DEPRECATED RDW RBC AUTO: 43.4 FL (ref 37–54)
EGFRCR SERPLBLD CKD-EPI 2021: 73.8 ML/MIN/1.73
EOSINOPHIL # BLD AUTO: 0.24 10*3/MM3 (ref 0–0.4)
EOSINOPHIL NFR BLD AUTO: 3.4 % (ref 0.3–6.2)
ERYTHROCYTE [DISTWIDTH] IN BLOOD BY AUTOMATED COUNT: 14.3 % (ref 12.3–15.4)
GLOBULIN UR ELPH-MCNC: 2.6 GM/DL
GLUCOSE SERPL-MCNC: 91 MG/DL (ref 65–99)
HCT VFR BLD AUTO: 45.2 % (ref 37.5–51)
HGB BLD-MCNC: 14.7 G/DL (ref 13–17.7)
IMM GRANULOCYTES # BLD AUTO: 0.04 10*3/MM3 (ref 0–0.05)
IMM GRANULOCYTES NFR BLD AUTO: 0.6 % (ref 0–0.5)
LYMPHOCYTES # BLD AUTO: 1.9 10*3/MM3 (ref 0.7–3.1)
LYMPHOCYTES NFR BLD AUTO: 26.5 % (ref 19.6–45.3)
MCH RBC QN AUTO: 27.3 PG (ref 26.6–33)
MCHC RBC AUTO-ENTMCNC: 32.5 G/DL (ref 31.5–35.7)
MCV RBC AUTO: 84 FL (ref 79–97)
MONOCYTES # BLD AUTO: 0.54 10*3/MM3 (ref 0.1–0.9)
MONOCYTES NFR BLD AUTO: 7.5 % (ref 5–12)
NEUTROPHILS NFR BLD AUTO: 4.38 10*3/MM3 (ref 1.7–7)
NEUTROPHILS NFR BLD AUTO: 61.2 % (ref 42.7–76)
NRBC BLD AUTO-RTO: 0 /100 WBC (ref 0–0.2)
PLATELET # BLD AUTO: 274 10*3/MM3 (ref 140–450)
PMV BLD AUTO: 9.8 FL (ref 6–12)
POTASSIUM SERPL-SCNC: 4.2 MMOL/L (ref 3.5–5.2)
PROT SERPL-MCNC: 6.7 G/DL (ref 6–8.5)
PSA SERPL-MCNC: <0.014 NG/ML (ref 0–4)
RBC # BLD AUTO: 5.38 10*6/MM3 (ref 4.14–5.8)
SODIUM SERPL-SCNC: 142 MMOL/L (ref 136–145)
WBC NRBC COR # BLD AUTO: 7.16 10*3/MM3 (ref 3.4–10.8)

## 2024-03-12 PROCEDURE — 84153 ASSAY OF PSA TOTAL: CPT

## 2024-03-12 PROCEDURE — 80053 COMPREHEN METABOLIC PANEL: CPT

## 2024-03-12 PROCEDURE — 36415 COLL VENOUS BLD VENIPUNCTURE: CPT

## 2024-03-12 PROCEDURE — 85025 COMPLETE CBC W/AUTO DIFF WBC: CPT

## 2024-03-18 ENCOUNTER — OFFICE VISIT (OUTPATIENT)
Dept: UROLOGY | Facility: CLINIC | Age: 84
End: 2024-03-18
Payer: MEDICARE

## 2024-03-18 VITALS
WEIGHT: 175 LBS | DIASTOLIC BLOOD PRESSURE: 64 MMHG | HEIGHT: 70 IN | SYSTOLIC BLOOD PRESSURE: 146 MMHG | BODY MASS INDEX: 25.05 KG/M2

## 2024-03-18 DIAGNOSIS — C61 PROSTATE CANCER: Primary | ICD-10-CM

## 2024-03-18 DIAGNOSIS — N39.3 STRESS INCONTINENCE, MALE: ICD-10-CM

## 2024-03-18 LAB
BILIRUB BLD-MCNC: NEGATIVE MG/DL
CLARITY, POC: CLEAR
COLOR UR: YELLOW
EXPIRATION DATE: ABNORMAL
GLUCOSE UR STRIP-MCNC: NEGATIVE MG/DL
KETONES UR QL: ABNORMAL
LEUKOCYTE EST, POC: NEGATIVE
Lab: ABNORMAL
NITRITE UR-MCNC: NEGATIVE MG/ML
PH UR: 6.5 [PH] (ref 5–8)
PROT UR STRIP-MCNC: NEGATIVE MG/DL
RBC # UR STRIP: NEGATIVE /UL
SP GR UR: 1.02 (ref 1–1.03)
UROBILINOGEN UR QL: NORMAL

## 2024-03-18 PROCEDURE — 81003 URINALYSIS AUTO W/O SCOPE: CPT | Performed by: UROLOGY

## 2024-03-18 PROCEDURE — 99214 OFFICE O/P EST MOD 30 MIN: CPT | Performed by: UROLOGY

## 2024-03-18 PROCEDURE — 3077F SYST BP >= 140 MM HG: CPT | Performed by: UROLOGY

## 2024-03-18 PROCEDURE — 3078F DIAST BP <80 MM HG: CPT | Performed by: UROLOGY

## 2024-03-18 NOTE — PROGRESS NOTES
"Chief Complaint  Prostate Cancer    Subjective          Lopez Nunez presents to [facility] UROLOGY    History of Present Illness  The patient follows up for prostate cancer. He is an 83-year-old gentleman who had a initially diagnosed with prostate cancer in 07/2021, at which time he had a laparoscopic robotic prostatectomy. PSA at the time of diagnosis was 8, [place] score was 4+3. His bone scan and CT scan prior to surgery were without evidence of metastasis.      His most recent PSA is less than 0.014 from March 2024.  His PSA has remained undetectable.    He does have some issues with stress urinary incontinence and uses 2-3 pads a day although they are not soaked when he does change them unless he has been doing anything really physical.  He is interested in Bulkamid to help correct this.      Objective   Vital Signs:   /64   Ht 177.8 cm (70\")   Wt 79.4 kg (175 lb)   BMI 25.11 kg/m²       Physical Exam  Vitals and nursing note reviewed.   Constitutional:       Appearance: Normal appearance. He is well-developed.   Pulmonary:      Effort: Pulmonary effort is normal.      Breath sounds: Normal air entry.   Neurological:      Mental Status: He is alert and oriented to person, place, and time.      Motor: Motor function is intact.   Psychiatric:         Mood and Affect: Mood normal.         Behavior: Behavior normal.          Result Review :   The following data was reviewed by: Dede Barker MD on 03/18/2024:    Results for orders placed or performed in visit on 03/18/24   POC Urinalysis Dipstick, Automated    Specimen: Urine   Result Value Ref Range    Color Yellow Yellow, Straw, Dark Yellow, Barbara    Clarity, UA Clear Clear    Specific Gravity  1.020 1.005 - 1.030    pH, Urine 6.5 5.0 - 8.0    Leukocytes Negative Negative    Nitrite, UA Negative Negative    Protein, POC Negative Negative mg/dL    Glucose, UA Negative Negative mg/dL    Ketones, UA Trace (A) Negative    " Urobilinogen, UA Normal Normal, 0.2 E.U./dL    Bilirubin Negative Negative    Blood, UA Negative Negative    Lot Number [id number]     Expiration Date 122,024        PSA          9/8/2023    09:30 3/12/2024    07:15   PSA   PSA <0.014  <0.014               Assessment and Plan    Diagnoses and all orders for this visit:    1. Prostate cancer (Primary)  -     POC Urinalysis Dipstick, Automated  -     PSA DIAGNOSTIC; Future    2. Stress incontinence, male    PSA and follow-up in 6 months.  I gave him a brochure on Bulkamid he will let me know if he like to schedule that.        Follow Up       No follow-ups on file.  Patient was given instructions and counseling regarding his condition or for health maintenance advice. Please see specific information pulled into the AVS if appropriate.

## 2024-08-27 ENCOUNTER — PRIOR AUTHORIZATION (OUTPATIENT)
Dept: NEUROLOGY | Facility: CLINIC | Age: 84
End: 2024-08-27
Payer: MEDICARE

## 2024-08-27 NOTE — TELEPHONE ENCOUNTER
Approved today by [employer] 2017  PA Case: [id number], Status: Approved, Coverage Starts on: 5/28/2024 12:00:00 AM, Coverage Ends on: 8/27/2025 12:00:00 AM.

## 2024-09-20 ENCOUNTER — TELEPHONE (OUTPATIENT)
Dept: UROLOGY | Facility: CLINIC | Age: 84
End: 2024-09-20
Payer: MEDICARE

## 2025-11-13 ENCOUNTER — APPOINTMENT (OUTPATIENT)
Dept: GASTROENTEROLOGY | Facility: CLINIC | Age: 85
End: 2025-11-13